# Patient Record
Sex: FEMALE | Race: WHITE | NOT HISPANIC OR LATINO | Employment: FULL TIME | URBAN - METROPOLITAN AREA
[De-identification: names, ages, dates, MRNs, and addresses within clinical notes are randomized per-mention and may not be internally consistent; named-entity substitution may affect disease eponyms.]

---

## 2019-11-22 ENCOUNTER — OFFICE VISIT (OUTPATIENT)
Dept: BARIATRICS/WEIGHT MGMT | Facility: CLINIC | Age: 64
End: 2019-11-22

## 2019-11-22 VITALS
SYSTOLIC BLOOD PRESSURE: 147 MMHG | TEMPERATURE: 97.5 F | DIASTOLIC BLOOD PRESSURE: 94 MMHG | BODY MASS INDEX: 41.95 KG/M2 | RESPIRATION RATE: 18 BRPM | HEART RATE: 67 BPM | HEIGHT: 70 IN | WEIGHT: 293 LBS

## 2019-11-22 DIAGNOSIS — R11.2 NAUSEA AND VOMITING, INTRACTABILITY OF VOMITING NOT SPECIFIED, UNSPECIFIED VOMITING TYPE: ICD-10-CM

## 2019-11-22 DIAGNOSIS — R13.19 OTHER DYSPHAGIA: Primary | ICD-10-CM

## 2019-11-22 DIAGNOSIS — E66.01 OBESITY, CLASS III, BMI 40-49.9 (MORBID OBESITY) (HCC): ICD-10-CM

## 2019-11-22 DIAGNOSIS — R10.13 DYSPEPSIA: ICD-10-CM

## 2019-11-22 DIAGNOSIS — Z98.84 HISTORY OF LAPAROSCOPIC ADJUSTABLE GASTRIC BANDING: ICD-10-CM

## 2019-11-22 PROCEDURE — 99203 OFFICE O/P NEW LOW 30 MIN: CPT | Performed by: SURGERY

## 2019-11-22 RX ORDER — CROMOLYN SODIUM 40 MG/ML
1 SOLUTION/ DROPS OPHTHALMIC AS NEEDED
COMMUNITY
Start: 2018-05-31

## 2019-11-22 RX ORDER — BUDESONIDE AND FORMOTEROL FUMARATE DIHYDRATE 80; 4.5 UG/1; UG/1
2 AEROSOL RESPIRATORY (INHALATION)
COMMUNITY
End: 2023-01-04 | Stop reason: ALTCHOICE

## 2019-11-22 RX ORDER — FUROSEMIDE 40 MG/1
20 TABLET ORAL EVERY OTHER DAY
COMMUNITY
Start: 2019-05-13 | End: 2022-03-14 | Stop reason: ALTCHOICE

## 2019-11-22 RX ORDER — PRAVASTATIN SODIUM 20 MG
20 TABLET ORAL NIGHTLY
COMMUNITY
Start: 2019-11-20

## 2019-11-22 RX ORDER — ALBUTEROL SULFATE 90 UG/1
2 AEROSOL, METERED RESPIRATORY (INHALATION) EVERY 4 HOURS PRN
COMMUNITY
Start: 2016-04-06

## 2019-11-22 RX ORDER — MELOXICAM 7.5 MG/1
7.5 TABLET ORAL DAILY
COMMUNITY
Start: 2019-10-30 | End: 2022-03-14 | Stop reason: ALTCHOICE

## 2019-11-22 RX ORDER — LEVOCETIRIZINE DIHYDROCHLORIDE 5 MG/1
5 TABLET, FILM COATED ORAL DAILY
COMMUNITY

## 2019-11-22 RX ORDER — ASPIRIN 81 MG/1
81 TABLET, CHEWABLE ORAL DAILY
COMMUNITY

## 2019-11-22 RX ORDER — SERTRALINE HYDROCHLORIDE 100 MG/1
150 TABLET, FILM COATED ORAL DAILY
COMMUNITY
Start: 2019-04-11

## 2019-11-22 RX ORDER — LOSARTAN POTASSIUM 100 MG/1
100 TABLET ORAL DAILY
COMMUNITY
Start: 2019-07-10

## 2019-11-22 NOTE — PROGRESS NOTES
[facility]ATR[facility] BARIATRIC SURGERY  [address] [facility] [address]  [facility] [address]  [phone]  [address] [id number] [phone]  Dept: [phone]  11/22/2019      Rosanne Adair.  [id number]  [id number]  1955  female      Chief Complaint   Patient presents with   • Consult     DISCUSS BAND REMOVAL        BH Post-Op Bariatric Surgery:   Rosanne Adair is status post Band procedure, performed on 2/24/09 Dr Resendiz at [facility].    HPI:   Today's weight is (!) 143 kg (316 lb) pounds, today's BMI is Body mass index is 45.34 kg/m²..  her greatest weight loss from surgery was 20 pounds. The patient reports an unwanted weight gain of 30 pounds.  [unfilled] denies fever, chills, chest pain, SOA, melena, hematochezia, hematemesis, dysuria, frequency, hematuria, jaundice or abdominal pain.    64-year-old female status post lap band placement 2009 who has not done well with her band.  She states that she has had continuous nausea and vomiting and regurgitation of food ever since the band was placed.  She feels like that this was not the right tool for her.  She would like to have the band removed.  She also complains of port site pain.      Diet and Exercise:   Diet history reviewed and discussed with the patient. Weight loss/gains to date discussed with the patient. She reports eating 3 meals per day, a typical portion size of 1 cup, eating 2 snacks per day, drinking 4 or more 8-oz. glasses of water per day, no carbonated beverage consumption and exercising regularly.     Supplements: Vitamin D    Review of Systems   Constitutional: Positive for fatigue.   Gastrointestinal: Positive for abdominal pain, nausea and vomiting.   All other systems reviewed and are negative.      Patient Active Problem List   Diagnosis   • Other dysphagia   • N&V (nausea and vomiting)   • Obesity, Class III, BMI 40-49.9 (morbid obesity) (CMS/[facility])   • Dyspepsia   •  History of laparoscopic adjustable gastric banding       No past medical history on file.    No past surgical history on file.    Allergies   Allergen Reactions   • Penicillins Anaphylaxis   • Latex Dermatitis         Current Outpatient Medications:   •  albuterol sulfate  (90 Base) MCG/ACT inhaler, Inhale 2 puffs., Disp: , Rfl:   •  aspirin 81 MG chewable tablet, Chew 81 mg Daily., Disp: , Rfl:   •  budesonide-formoterol (SYMBICORT) 80-4.5 MCG/ACT inhaler, Inhale 2 puffs., Disp: , Rfl:   •  Cholecalciferol (D 1000) 25 MCG (1000 UT) capsule, Take 2,000 Units by mouth Daily., Disp: , Rfl:   •  cromolyn (OPTICROM) 4 % ophthalmic solution, INSTILL ONE DROP TO THE AFFECTED EYE(S) FOUR TIMES A DAY, Disp: , Rfl:   •  furosemide (LASIX) 40 MG tablet, TAKE ONE TABLET BY MOUTH DAILY, Disp: , Rfl:   •  levocetirizine (XYZAL) 5 MG tablet, Take 5 mg by mouth Daily., Disp: , Rfl:   •  losartan (COZAAR) 100 MG tablet, TAKE ONE TABLET BY MOUTH DAILY, Disp: , Rfl:   •  meloxicam (MOBIC) 7.5 MG tablet, TAKE ONE TABLET BY MOUTH DAILY, Disp: , Rfl:   •  Olopatadine HCl (PAZEO) 0.7 % solution, As Needed., Disp: , Rfl:   •  pravastatin (PRAVACHOL) 20 MG tablet, TAKE ONE TABLET BY MOUTH ONCE NIGHTLY, Disp: , Rfl:   •  sertraline (ZOLOFT) 100 MG tablet, TAKE ONE AND ONE-HALF (1 & 1/2) TABLET BY MOUTH DAILY, Disp: , Rfl:     Social History     Socioeconomic History   • Marital status:      Spouse name: Not on file   • Number of children: Not on file   • Years of education: Not on file   • Highest education level: Not on file       No family history on file.    The following portions of the patient's history were reviewed and updated as appropriate: allergies, current medications, past family history, past medical history, past social history, past surgical history and problem list.    Vitals:    11/22/19 1005   BP: 147/94   Pulse: 67   Resp: 18   Temp: 97.5 °F (36.4 °C)       Physical Exam   Constitutional: She is oriented to  person, place, and time. She appears well-nourished.   HENT:   Head: Normocephalic and atraumatic.   Mouth/Throat: Oropharynx is clear and moist.   Eyes: Conjunctivae and EOM are normal. Pupils are equal, round, and reactive to light. No scleral icterus.   Neck: Normal range of motion. Neck supple. No thyromegaly present.   Cardiovascular: Normal rate and regular rhythm.   Pulmonary/Chest: Effort normal and breath sounds normal.   Abdominal: Soft. Bowel sounds are normal. She exhibits no distension. There is tenderness. There is no rebound and no guarding. No hernia.   Tenderness in the left upper quadrant at the port site   Musculoskeletal: Normal range of motion.   Lymphadenopathy:     She has no cervical adenopathy.   Neurological: She is alert and oriented to person, place, and time. No cranial nerve deficit. Coordination normal.   Skin: Skin is warm and dry. No erythema.   Psychiatric: She has a normal mood and affect. Her behavior is normal.   Vitals reviewed.          Assessment:   Rosanne Adair has severe obesity with multiple co-morbidities who would like to transfer her bariatric care to us and participate in our bariatric program.      Encounter Diagnoses   Name Primary?   • Other dysphagia Yes   • Nausea and vomiting, intractability of vomiting not specified, unspecified vomiting type    • Dyspepsia    • Obesity, Class III, BMI 40-49.9 (morbid obesity) (CMS/HCC)    • History of laparoscopic adjustable gastric banding          Discussion/Summary/Plan:     64-year-old female status post lap band placement 2009 by Dr. Resendiz who would like to transfer her care to us.  Patient continues to have regurgitation of food, nausea and vomiting dysphasia with solids and port site pain.  Patient would like to proceed with band removal.  We will go ahead and order an upper GI to evaluate the lap band.  If unremarkable then we will proceed with upper endoscopy.    Recommended patient be sure to eat at least three meals  per day all with high lean protein, vegetables and fruit. Be sure to limit/cut back on daily simple carbohydrate intake. Discussed with the patient the recommended amount of water per day to intake. Reviewed vitamin requirements. Be sure to do routine exercise including both cardio and strength training. Recommended patient to see our dietician to go into more detail regarding diet changes.    Instructions / Recommendations: dietary counseling recommended, recommended a daily protein intake of  grams, vitamin supplements recommended, recommended exercising at least 150 minutes per week, behavior modifications recommended and instructed to call the office for concerns, questions, or problems.    The patient was instructed to follow up in 2 to 3 weeks.     The patient was counseled regarding diet, exercise and the surgical procedures available. Our bariatric manual was given to the patient and was discussed in detail. Dietician appointment was highly recommended as well as attending support groups.  Total time of encounter was over 35 minutes and 30 minutes was spent counseling.

## 2019-12-06 ENCOUNTER — HOSPITAL ENCOUNTER (OUTPATIENT)
Dept: GENERAL RADIOLOGY | Facility: HOSPITAL | Age: 64
Discharge: HOME | End: 2019-12-06
Admitting: SURGERY

## 2019-12-06 PROCEDURE — 74241: CPT

## 2019-12-06 RX ADMIN — BARIUM SULFATE 70 ML: 960 POWDER, FOR SUSPENSION ORAL at 10:15

## 2019-12-13 ENCOUNTER — PREP FOR SURGERY (OUTPATIENT)
Dept: OTHER | Facility: HOSPITAL | Age: 64
End: 2019-12-13

## 2019-12-13 DIAGNOSIS — R11.2 NAUSEA AND VOMITING, INTRACTABILITY OF VOMITING NOT SPECIFIED, UNSPECIFIED VOMITING TYPE: Primary | ICD-10-CM

## 2019-12-13 RX ORDER — SODIUM CHLORIDE, SODIUM LACTATE, POTASSIUM CHLORIDE, CALCIUM CHLORIDE 600; 310; 30; 20 MG/100ML; MG/100ML; MG/100ML; MG/100ML
30 INJECTION, SOLUTION INTRAVENOUS CONTINUOUS
Status: CANCELLED | OUTPATIENT
Start: 2019-12-24

## 2019-12-18 PROBLEM — R11.2 NAUSEA AND VOMITING: Status: ACTIVE | Noted: 2019-12-18

## 2019-12-24 ENCOUNTER — ANESTHESIA (OUTPATIENT)
Dept: GASTROENTEROLOGY | Facility: HOSPITAL | Age: 64
End: 2019-12-24

## 2019-12-24 ENCOUNTER — HOSPITAL ENCOUNTER (OUTPATIENT)
Facility: HOSPITAL | Age: 64
Setting detail: HOSPITAL OUTPATIENT SURGERY
Discharge: HOME | End: 2019-12-24
Attending: SURGERY | Admitting: SURGERY

## 2019-12-24 ENCOUNTER — ANESTHESIA EVENT (OUTPATIENT)
Dept: GASTROENTEROLOGY | Facility: HOSPITAL | Age: 64
End: 2019-12-24

## 2019-12-24 VITALS
HEART RATE: 68 BPM | BODY MASS INDEX: 41.95 KG/M2 | TEMPERATURE: 98 F | DIASTOLIC BLOOD PRESSURE: 74 MMHG | HEIGHT: 70 IN | OXYGEN SATURATION: 99 % | WEIGHT: 293 LBS | SYSTOLIC BLOOD PRESSURE: 140 MMHG | RESPIRATION RATE: 16 BRPM

## 2019-12-24 DIAGNOSIS — R11.2 NAUSEA AND VOMITING, INTRACTABILITY OF VOMITING NOT SPECIFIED, UNSPECIFIED VOMITING TYPE: ICD-10-CM

## 2019-12-24 PROCEDURE — 87081 CULTURE SCREEN ONLY: CPT | Performed by: SURGERY

## 2019-12-24 PROCEDURE — 43239 EGD BIOPSY SINGLE/MULTIPLE: CPT | Performed by: SURGERY

## 2019-12-24 PROCEDURE — 25010000002 PROPOFOL 10 MG/ML EMULSION: Performed by: NURSE ANESTHETIST, CERTIFIED REGISTERED

## 2019-12-24 RX ORDER — SODIUM CHLORIDE 0.9 % (FLUSH) 0.9 %
3 SYRINGE (ML) INJECTION EVERY 12 HOURS SCHEDULED
Status: DISCONTINUED | OUTPATIENT
Start: 2019-12-24 | End: 2019-12-24 | Stop reason: HOSPADM

## 2019-12-24 RX ORDER — SODIUM CHLORIDE 0.9 % (FLUSH) 0.9 %
10 SYRINGE (ML) INJECTION AS NEEDED
Status: DISCONTINUED | OUTPATIENT
Start: 2019-12-24 | End: 2019-12-24 | Stop reason: HOSPADM

## 2019-12-24 RX ORDER — ONDANSETRON 2 MG/ML
INJECTION INTRAMUSCULAR; INTRAVENOUS AS NEEDED
Status: DISCONTINUED | OUTPATIENT
Start: 2019-12-24 | End: 2019-12-24

## 2019-12-24 RX ORDER — SODIUM CHLORIDE, SODIUM LACTATE, POTASSIUM CHLORIDE, CALCIUM CHLORIDE 600; 310; 30; 20 MG/100ML; MG/100ML; MG/100ML; MG/100ML
30 INJECTION, SOLUTION INTRAVENOUS CONTINUOUS
Status: DISCONTINUED | OUTPATIENT
Start: 2019-12-24 | End: 2019-12-24 | Stop reason: HOSPADM

## 2019-12-24 RX ORDER — LIDOCAINE HYDROCHLORIDE 20 MG/ML
INJECTION, SOLUTION INFILTRATION; PERINEURAL AS NEEDED
Status: DISCONTINUED | OUTPATIENT
Start: 2019-12-24 | End: 2019-12-24 | Stop reason: SURG

## 2019-12-24 RX ORDER — PROPOFOL 10 MG/ML
VIAL (ML) INTRAVENOUS AS NEEDED
Status: DISCONTINUED | OUTPATIENT
Start: 2019-12-24 | End: 2019-12-24 | Stop reason: SURG

## 2019-12-24 RX ORDER — PROPOFOL 10 MG/ML
VIAL (ML) INTRAVENOUS CONTINUOUS PRN
Status: DISCONTINUED | OUTPATIENT
Start: 2019-12-24 | End: 2019-12-24 | Stop reason: SURG

## 2019-12-24 RX ORDER — SODIUM CHLORIDE, SODIUM LACTATE, POTASSIUM CHLORIDE, CALCIUM CHLORIDE 600; 310; 30; 20 MG/100ML; MG/100ML; MG/100ML; MG/100ML
30 INJECTION, SOLUTION INTRAVENOUS CONTINUOUS PRN
Status: DISCONTINUED | OUTPATIENT
Start: 2019-12-24 | End: 2019-12-24 | Stop reason: HOSPADM

## 2019-12-24 RX ADMIN — PROPOFOL 50 MG: 10 INJECTION, EMULSION INTRAVENOUS at 07:08

## 2019-12-24 RX ADMIN — PROPOFOL 300 MCG/KG/MIN: 10 INJECTION, EMULSION INTRAVENOUS at 07:06

## 2019-12-24 RX ADMIN — LIDOCAINE HYDROCHLORIDE 40 MG: 20 INJECTION, SOLUTION INFILTRATION; PERINEURAL at 07:06

## 2019-12-24 RX ADMIN — SODIUM CHLORIDE, POTASSIUM CHLORIDE, SODIUM LACTATE AND CALCIUM CHLORIDE 30 ML/HR: 600; 310; 30; 20 INJECTION, SOLUTION INTRAVENOUS at 06:23

## 2019-12-24 RX ADMIN — PROPOFOL 50 MG: 10 INJECTION, EMULSION INTRAVENOUS at 07:06

## 2019-12-24 NOTE — ANESTHESIA POSTPROCEDURE EVALUATION
Patient: Rosanne Adair    Procedure Summary     Date:  12/24/19 Room / Location:   [facility] /  [facility]    Anesthesia Start:  0658 Anesthesia Stop:  0712    Procedure:  ESOPHAGOGASTRODUODENOSCOPY WITH BIOPSY (N/A Esophagus) Diagnosis:       Nausea and vomiting, intractability of vomiting not specified, unspecified vomiting type      (Nausea and vomiting, intractability of vomiting not specified, unspecified vomiting type [R11.2])    Surgeon:  Wilbert Szymanski Jr., MD Provider:  Wilbert Lipscomb MD    Anesthesia Type:  MAC ASA Status:  3          Anesthesia Type: MAC    Vitals  Vitals Value Taken Time   /92 12/24/2019  7:17 AM   Temp     Pulse 61 12/24/2019  7:17 AM   Resp 16 12/24/2019  7:17 AM   SpO2 98 % 12/24/2019  7:17 AM           Post Anesthesia Care and Evaluation    Patient location during evaluation: bedside  Patient participation: complete - patient participated  Level of consciousness: awake and alert  Pain score: 0  Pain management: adequate  Airway patency: patent  Anesthetic complications: No anesthetic complications  PONV Status: none  Cardiovascular status: acceptable  Respiratory status: acceptable  Hydration status: acceptable  Post Neuraxial Block status: Motor and sensory function returned to baseline

## 2019-12-24 NOTE — H&P
[facility] BARIATRIC SURGERY  [address] [facility] [address]  [facility] [address]  [phone]  [address] [id number] [phone]  Dept: [phone]  11/22/2019        Rosanne Adair.  [id number]  [id number]  1955  female             Chief Complaint   Patient presents with   • Consult       DISCUSS BAND REMOVAL          BH Post-Op Bariatric Surgery:   Rosanne Adair is status post Band procedure, performed on 2/24/09 Dr Resendiz at [facility].     HPI:   Today's weight is (!) 143 kg (316 lb) pounds, today's BMI is Body mass index is 45.34 kg/m²..  her greatest weight loss from surgery was 20 pounds. The patient reports an unwanted weight gain of 30 pounds.  [unfilled] denies fever, chills, chest pain, SOA, melena, hematochezia, hematemesis, dysuria, frequency, hematuria, jaundice or abdominal pain.     64-year-old female status post lap band placement 2009 who has not done well with her band.  She states that she has had continuous nausea and vomiting and regurgitation of food ever since the band was placed.  She feels like that this was not the right tool for her.  She would like to have the band removed.  She also complains of port site pain.        Diet and Exercise:   Diet history reviewed and discussed with the patient. Weight loss/gains to date discussed with the patient. She reports eating 3 meals per day, a typical portion size of 1 cup, eating 2 snacks per day, drinking 4 or more 8-oz. glasses of water per day, no carbonated beverage consumption and exercising regularly.      Supplements: Vitamin D     Review of Systems   Constitutional: Positive for fatigue.   Gastrointestinal: Positive for abdominal pain, nausea and vomiting.   All other systems reviewed and are negative.            Patient Active Problem List   Diagnosis   • Other dysphagia   • N&V (nausea and vomiting)   • Obesity, Class III, BMI 40-49.9 (morbid obesity)  (CMS/[facility])   • Dyspepsia   • History of laparoscopic adjustable gastric banding         Medical History   No past medical history on file.        Surgical History   No past surgical history on file.             Allergies   Allergen Reactions   • Penicillins Anaphylaxis   • Latex Dermatitis            Current Outpatient Medications:   •  albuterol sulfate  (90 Base) MCG/ACT inhaler, Inhale 2 puffs., Disp: , Rfl:   •  aspirin 81 MG chewable tablet, Chew 81 mg Daily., Disp: , Rfl:   •  budesonide-formoterol (SYMBICORT) 80-4.5 MCG/ACT inhaler, Inhale 2 puffs., Disp: , Rfl:   •  Cholecalciferol (D 1000) 25 MCG (1000 UT) capsule, Take 2,000 Units by mouth Daily., Disp: , Rfl:   •  cromolyn (OPTICROM) 4 % ophthalmic solution, INSTILL ONE DROP TO THE AFFECTED EYE(S) FOUR TIMES A DAY, Disp: , Rfl:   •  furosemide (LASIX) 40 MG tablet, TAKE ONE TABLET BY MOUTH DAILY, Disp: , Rfl:   •  levocetirizine (XYZAL) 5 MG tablet, Take 5 mg by mouth Daily., Disp: , Rfl:   •  losartan (COZAAR) 100 MG tablet, TAKE ONE TABLET BY MOUTH DAILY, Disp: , Rfl:   •  meloxicam (MOBIC) 7.5 MG tablet, TAKE ONE TABLET BY MOUTH DAILY, Disp: , Rfl:   •  Olopatadine HCl (PAZEO) 0.7 % solution, As Needed., Disp: , Rfl:   •  pravastatin (PRAVACHOL) 20 MG tablet, TAKE ONE TABLET BY MOUTH ONCE NIGHTLY, Disp: , Rfl:   •  sertraline (ZOLOFT) 100 MG tablet, TAKE ONE AND ONE-HALF (1 & 1/2) TABLET BY MOUTH DAILY, Disp: , Rfl:      Social History   Social History            Socioeconomic History   • Marital status:        Spouse name: Not on file   • Number of children: Not on file   • Years of education: Not on file   • Highest education level: Not on file            No family history on file.     The following portions of the patient's history were reviewed and updated as appropriate: allergies, current medications, past family history, past medical history, past social history, past surgical history and problem list.         Vitals:     11/22/19  1005   BP: 147/94   Pulse: 67   Resp: 18   Temp: 97.5 °F (36.4 °C)         Physical Exam   Constitutional: She is oriented to person, place, and time. She appears well-nourished.   HENT:   Head: Normocephalic and atraumatic.   Mouth/Throat: Oropharynx is clear and moist.   Eyes: Conjunctivae and EOM are normal. Pupils are equal, round, and reactive to light. No scleral icterus.   Neck: Normal range of motion. Neck supple. No thyromegaly present.   Cardiovascular: Normal rate and regular rhythm.   Pulmonary/Chest: Effort normal and breath sounds normal.   Abdominal: Soft. Bowel sounds are normal. She exhibits no distension. There is tenderness. There is no rebound and no guarding. No hernia.   Tenderness in the left upper quadrant at the port site   Musculoskeletal: Normal range of motion.   Lymphadenopathy:     She has no cervical adenopathy.   Neurological: She is alert and oriented to person, place, and time. No cranial nerve deficit. Coordination normal.   Skin: Skin is warm and dry. No erythema.   Psychiatric: She has a normal mood and affect. Her behavior is normal.   Vitals reviewed.              Assessment:   Rosanne Adair has severe obesity with multiple co-morbidities who would like to transfer her bariatric care to us and participate in our bariatric program.               Encounter Diagnoses   Name Primary?   • Other dysphagia Yes   • Nausea and vomiting, intractability of vomiting not specified, unspecified vomiting type     • Dyspepsia     • Obesity, Class III, BMI 40-49.9 (morbid obesity) (CMS/[facility])     • History of laparoscopic adjustable gastric banding              Discussion/Summary/Plan:      64-year-old female status post lap band placement 2009 by Dr. Resendiz who would like to transfer her care to us.  Patient continues to have regurgitation of food, nausea and vomiting dysphasia with solids and port site pain.  Patient would like to proceed with band removal.  We will go ahead and order an upper  GI to evaluate the lap band.  If unremarkable then we will proceed with upper endoscopy.     Recommended patient be sure to eat at least three meals per day all with high lean protein, vegetables and fruit. Be sure to limit/cut back on daily simple carbohydrate intake. Discussed with the patient the recommended amount of water per day to intake. Reviewed vitamin requirements. Be sure to do routine exercise including both cardio and strength training. Recommended patient to see our dietician to go into more detail regarding diet changes.     Instructions / Recommendations: dietary counseling recommended, recommended a daily protein intake of  grams, vitamin supplements recommended, recommended exercising at least 150 minutes per week, behavior modifications recommended and instructed to call the office for concerns, questions, or problems.     The patient was instructed to follow up in 2 to 3 weeks.      The patient was counseled regarding diet, exercise and the surgical procedures available. Our bariatric manual was given to the patient and was discussed in detail. Dietician appointment was highly recommended as well as attending support groups.  Total time of encounter was over 35 minutes and 30 minutes was spent counseling.

## 2019-12-24 NOTE — DISCHARGE INSTRUCTIONS
For the next 24 hours patient needs to be with a responsible adult.    For 24 hours DO NOT drive, operate machinery, appliances, drink alcohol, make important decisions or sign legal documents.    Start with a light or bland diet if you are feeling sick to your stomach otherwise advance to regular diet as tolerated.    Follow recommendations on procedure report if provided by your doctor.    Call Dr. Szymanski for problems [phone]    Problems may include but not limited to: large amounts of bleeding, trouble breathing, repeated vomiting, severe unrelieved pain, fever or chills.

## 2019-12-24 NOTE — ANESTHESIA PREPROCEDURE EVALUATION
Anesthesia Evaluation     Patient summary reviewed   NPO Solid Status: > 8 hours  NPO Liquid Status: > 8 hours           Airway   Mallampati: II  TM distance: >3 FB  Neck ROM: full  No difficulty expected  Dental - normal exam     Pulmonary     breath sounds clear to auscultation  Cardiovascular   Exercise tolerance: good (4-7 METS)    Rhythm: regular  Rate: normal        Neuro/Psych  GI/Hepatic/Renal/Endo      Musculoskeletal     Abdominal    Substance History      OB/GYN          Other                        Anesthesia Plan    ASA 3     MAC     intravenous induction     Anesthetic plan, all risks, benefits, and alternatives have been provided, discussed and informed consent has been obtained with: patient.

## 2019-12-24 NOTE — OP NOTE
Surgeon: Wilbert Szymanski Jr., M.D.    Preoperative Diagnosis: Dysphagia with solids with history of gastric band    Postoperative Diagnosis: #1 gastritis #2 pouch dilatation    Procedure Performed: Transoral esophagogastroduodenoscopy with antral biopsies    Indications: 64-year-old female status post gastric band with complaints of dysphagia with solids    Procedure:     The procedure, indications, preparation and potential complications were explained to the patient, who indicated understanding and signed the corresponding consent forms.  The patient was identified, taken to the endoscopy suite, and placed on the left side down decubitus position.  The patient underwent a MAC anesthesia and was appropriately monitored through the case by the anesthesia personnel with continuous pulse oximetry, blood pressure, and cardiac monitoring.  A bite block was placed.  After adequate IV sedation and using a transoral technique a lubed flexible endoscope was placed in the hypopharynx and advanced to the second portion of the duodenum without difficulty. The scope was then withdrawn back into the antrum of the stomach.  Cold forcep biopsies of the antrum were taken to rule out Helicobacter pylori.  The scope was retroflexed noting the body, fundus and cardia.  The scope was then withdrawn back into the esophagus after decompressing the stomach.  The Z line was noted and GE junction measured from the incisors.  The scope was then completely withdrawn.  The patient tolerated the procedure well and left the endoscopy suite in stable condition.  The findings are listed below.    Duodenum: Unremarkable  Antrum: Moderate patchy erythema  Body/Fundus: Patchy erythema,: Indentation of lap band noted without evidence of erosion  Cardia: Minimal pouch dilatation  Esophagus: Z line regular, no erosive esophagitis; GE junction measured approximately 40 cm from the incisors    Recommendations:     Await biopsy results and follow-up in the  office.  We will plan for band removal

## 2019-12-25 LAB — UREASE TISS QL: NEGATIVE

## 2019-12-26 ENCOUNTER — TELEPHONE (OUTPATIENT)
Dept: BARIATRICS/WEIGHT MGMT | Facility: CLINIC | Age: 64
End: 2019-12-26

## 2019-12-26 NOTE — TELEPHONE ENCOUNTER
Spoke to patient and informed her of negative results.     ----- Message from Wilbert Szymanski Jr., MD sent at 12/26/2019  6:44 AM EST -----  Please call patient with negative results.

## 2020-01-02 ENCOUNTER — OFFICE VISIT (OUTPATIENT)
Dept: BARIATRICS/WEIGHT MGMT | Facility: CLINIC | Age: 65
End: 2020-01-02

## 2020-01-02 VITALS
HEIGHT: 70 IN | TEMPERATURE: 97.3 F | HEART RATE: 69 BPM | DIASTOLIC BLOOD PRESSURE: 75 MMHG | SYSTOLIC BLOOD PRESSURE: 149 MMHG | BODY MASS INDEX: 41.95 KG/M2 | WEIGHT: 293 LBS | RESPIRATION RATE: 18 BRPM

## 2020-01-02 DIAGNOSIS — K95.09 GASTRIC BAND MALFUNCTION: ICD-10-CM

## 2020-01-02 DIAGNOSIS — R10.13 DYSPEPSIA: ICD-10-CM

## 2020-01-02 DIAGNOSIS — E66.01 OBESITY, CLASS III, BMI 40-49.9 (MORBID OBESITY) (HCC): Primary | ICD-10-CM

## 2020-01-02 DIAGNOSIS — R11.2 NAUSEA AND VOMITING, INTRACTABILITY OF VOMITING NOT SPECIFIED, UNSPECIFIED VOMITING TYPE: ICD-10-CM

## 2020-01-02 DIAGNOSIS — Z98.84 HISTORY OF LAPAROSCOPIC ADJUSTABLE GASTRIC BANDING: ICD-10-CM

## 2020-01-02 DIAGNOSIS — R13.19 OTHER DYSPHAGIA: ICD-10-CM

## 2020-01-02 PROCEDURE — 99214 OFFICE O/P EST MOD 30 MIN: CPT | Performed by: SURGERY

## 2020-01-02 NOTE — H&P
[facility] BARIATRIC SURGERY  [address] [address]  [phone]  [address] [address]  [phone]  Dept: [phone]  1/2/2020      Rosanne Adair.  [id number]  [id number]  1955  female      Chief Complaint   Patient presents with   • Follow-up     EGD FOLLOW UP       BH Post-Op Bariatric Surgery:   Rosanne Adair is status post Lapband procedure, performed on 2/24/09.     HPI:   Today's weight is 136 kg (299 lb)  pounds, today's BMI is Body mass index is 42.9 kg/m².. The patient reports difficulty with solid foods.    Patient admits to nausea and dysphagia. The patient denies abdominal pain. The patientdoes have vomitng. The patientdoes have reflux.    Diet and Exercise: Diet history reviewed and discussed with the patient. Weight loss/gains to date discussed with the patient. She reports eating 3 meals per day, a typical portion size of 1 cup, eating 2 snack per day, drinking 4 8-oz. glasses of water per day. The patient cannot tolerate solid protein.   The patient is not eating protein first. The patient is limiting food volume. The patient is taking vitamins. The patient is limiting snacking.    The following portions of the patient's history were reviewed and updated as appropriate: allergies, current medications, past family history, past medical history, past social history, past surgical history and problem list.    Vitals:    01/02/20 1450   BP: 149/75   Pulse: 69   Resp: 18   Temp: 97.3 °F (36.3 °C)       Review of Systems   Constitutional: Positive for fatigue.   Gastrointestinal: Positive for abdominal pain, nausea and vomiting.   Musculoskeletal: Positive for arthralgias and back pain.   All other systems reviewed and are negative.      Physical Exam   Constitutional: She is oriented to person, place, and time. She appears well-nourished.   HENT:   Head: Normocephalic and atraumatic.    Mouth/Throat: Oropharynx is clear and moist.   Eyes: Pupils are equal, round, and reactive to light. Conjunctivae and EOM are normal. No scleral icterus.   Neck: Normal range of motion. Neck supple. No thyromegaly present.   Cardiovascular: Normal rate and regular rhythm.   Pulmonary/Chest: Effort normal and breath sounds normal.   Abdominal: Soft. Bowel sounds are normal. She exhibits no distension. There is no rebound. No hernia.   Mild tenderness port site left upper quadrant   Musculoskeletal: Normal range of motion.   Lymphadenopathy:     She has no cervical adenopathy.   Neurological: She is alert and oriented to person, place, and time. No cranial nerve deficit. Coordination normal.   Skin: Skin is warm and dry. No erythema.   Psychiatric: She has a normal mood and affect. Her behavior is normal.   Vitals reviewed.      Assessment: Post-operatively the patient status post lap band February 2009 by Dr. Resendiz at [facility] with complaints of dysphagia and heartburn as well as port site pain.  Upper endoscopy did reveal pouch dilatation.  Patient would like to proceed with band removal.  The risks and benefits of the procedure were discussed with the patient in detail and all questions were answered.  Possibility of open, bleeding, infection, bowel injury, deep venous thrombosis, pulmonary embolism, incisional hernias, renal failure, myocardial infarction, respiratory and cardiac arrest and death were discussed. Consent will be signed and witnessed.    Encounter Diagnoses   Name Primary?   • Obesity, Class III, BMI 40-49.9 (morbid obesity) (CMS/[facility]) Yes   • Nausea and vomiting, intractability of vomiting not specified, unspecified vomiting type    • Other dysphagia    • Dyspepsia    • History of laparoscopic adjustable gastric banding    • Gastric band malfunction        Plan:    My impression is Rosanne Adair has a problem of her band as listed above.  I think she would benefit from band removal.     Reviewed  the importance of being able to tolerate dense/solid protein and vegetables for weight loss. Discussed eating foods that are easier to tolerate, softer foods, usually contribute to weight gain because they are higher calorie/carb and will not keep patient full for the recommended 3-4 hours.      She should follow-up after band removal.      UGI discussed with patient in detail and all questions answered  EGD nkechi with patient in detail and all questions answered.  Patient was noted to have gastritis and H. pylori negative.  Patient will take an H2 blocker PPI over-the-counter.  Also instructed about taking nonsteroidals like her mobic. She will discuss this with her primary care physician.    Approximately 25 minutes was spent with the patient and over 20 minutes spent counseling.    Activity restrictions: None.   Instructions / Recommendations: dietary counseling recommended, recommended a daily protein intake of  grams, patient was advised that the lap band system works best when consuming solid foods, vitamin supplement(s) recommended, recommended exercising at least 150 minutes per week, behavior modifications recommended and instructed to call the office for concerns, questions, or problems.

## 2020-01-13 ENCOUNTER — PREP FOR SURGERY (OUTPATIENT)
Dept: OTHER | Facility: HOSPITAL | Age: 65
End: 2020-01-13

## 2020-01-13 DIAGNOSIS — R13.19 OTHER DYSPHAGIA: Primary | ICD-10-CM

## 2020-01-13 RX ORDER — SODIUM CHLORIDE, SODIUM LACTATE, POTASSIUM CHLORIDE, CALCIUM CHLORIDE 600; 310; 30; 20 MG/100ML; MG/100ML; MG/100ML; MG/100ML
100 INJECTION, SOLUTION INTRAVENOUS CONTINUOUS
Status: CANCELLED | OUTPATIENT
Start: 2020-02-12

## 2020-01-13 RX ORDER — SCOLOPAMINE TRANSDERMAL SYSTEM 1 MG/1
1 PATCH, EXTENDED RELEASE TRANSDERMAL CONTINUOUS
Status: CANCELLED | OUTPATIENT
Start: 2020-02-12 | End: 2020-02-15

## 2020-01-13 RX ORDER — CHLORHEXIDINE GLUCONATE 0.12 MG/ML
15 RINSE ORAL
Status: CANCELLED | OUTPATIENT
Start: 2020-02-12 | End: 2020-02-12

## 2020-01-13 RX ORDER — ACETAMINOPHEN 160 MG/5ML
975 SOLUTION ORAL ONCE
Status: CANCELLED | OUTPATIENT
Start: 2020-02-12 | End: 2020-01-13

## 2020-01-13 RX ORDER — PANTOPRAZOLE SODIUM 40 MG/10ML
40 INJECTION, POWDER, LYOPHILIZED, FOR SOLUTION INTRAVENOUS ONCE
Status: CANCELLED | OUTPATIENT
Start: 2020-02-12 | End: 2020-01-13

## 2020-01-13 RX ORDER — SODIUM CHLORIDE 0.9 % (FLUSH) 0.9 %
3 SYRINGE (ML) INJECTION EVERY 12 HOURS SCHEDULED
Status: CANCELLED | OUTPATIENT
Start: 2020-02-12

## 2020-01-13 RX ORDER — SODIUM CHLORIDE 0.9 % (FLUSH) 0.9 %
1-10 SYRINGE (ML) INJECTION AS NEEDED
Status: CANCELLED | OUTPATIENT
Start: 2020-02-12

## 2020-01-13 RX ORDER — METOCLOPRAMIDE HYDROCHLORIDE 5 MG/ML
10 INJECTION INTRAMUSCULAR; INTRAVENOUS ONCE
Status: CANCELLED | OUTPATIENT
Start: 2020-02-12 | End: 2020-01-13

## 2020-01-28 ENCOUNTER — APPOINTMENT (OUTPATIENT)
Dept: PREADMISSION TESTING | Facility: HOSPITAL | Age: 65
End: 2020-01-28

## 2020-01-28 VITALS
SYSTOLIC BLOOD PRESSURE: 146 MMHG | WEIGHT: 290 LBS | TEMPERATURE: 97.6 F | DIASTOLIC BLOOD PRESSURE: 83 MMHG | OXYGEN SATURATION: 98 % | BODY MASS INDEX: 41.52 KG/M2 | RESPIRATION RATE: 16 BRPM | HEIGHT: 70 IN | HEART RATE: 52 BPM

## 2020-01-28 DIAGNOSIS — R13.19 OTHER DYSPHAGIA: ICD-10-CM

## 2020-01-28 LAB
ALBUMIN SERPL-MCNC: 4.5 G/DL (ref 3.5–5.2)
ALBUMIN/GLOB SERPL: 2 G/DL
ALP SERPL-CCNC: 92 U/L (ref 39–117)
ALT SERPL W P-5'-P-CCNC: 19 U/L (ref 1–33)
ANION GAP SERPL CALCULATED.3IONS-SCNC: 16.4 MMOL/L (ref 5–15)
AST SERPL-CCNC: 19 U/L (ref 1–32)
BILIRUB SERPL-MCNC: 0.4 MG/DL (ref 0.2–1.2)
BUN BLD-MCNC: 14 MG/DL (ref 8–23)
BUN/CREAT SERPL: 12.1 (ref 7–25)
CALCIUM SPEC-SCNC: 9.4 MG/DL (ref 8.6–10.5)
CHLORIDE SERPL-SCNC: 98 MMOL/L (ref 98–107)
CO2 SERPL-SCNC: 24.6 MMOL/L (ref 22–29)
CREAT BLD-MCNC: 1.16 MG/DL (ref 0.57–1)
DEPRECATED RDW RBC AUTO: 41.4 FL (ref 37–54)
ERYTHROCYTE [DISTWIDTH] IN BLOOD BY AUTOMATED COUNT: 13.1 % (ref 12.3–15.4)
GFR SERPL CREATININE-BSD FRML MDRD: 47 ML/MIN/1.73
GLOBULIN UR ELPH-MCNC: 2.2 GM/DL
GLUCOSE BLD-MCNC: 70 MG/DL (ref 65–99)
HCT VFR BLD AUTO: 38 % (ref 34–46.6)
HGB BLD-MCNC: 13 G/DL (ref 12–15.9)
MCH RBC QN AUTO: 29.7 PG (ref 26.6–33)
MCHC RBC AUTO-ENTMCNC: 34.2 G/DL (ref 31.5–35.7)
MCV RBC AUTO: 86.8 FL (ref 79–97)
PLATELET # BLD AUTO: 154 10*3/MM3 (ref 140–450)
PMV BLD AUTO: 9.8 FL (ref 6–12)
POTASSIUM BLD-SCNC: 4.1 MMOL/L (ref 3.5–5.2)
PROT SERPL-MCNC: 6.7 G/DL (ref 6–8.5)
RBC # BLD AUTO: 4.38 10*6/MM3 (ref 3.77–5.28)
SODIUM BLD-SCNC: 139 MMOL/L (ref 136–145)
WBC NRBC COR # BLD: 3.5 10*3/MM3 (ref 3.4–10.8)

## 2020-01-28 PROCEDURE — 93005 ELECTROCARDIOGRAM TRACING: CPT

## 2020-01-28 PROCEDURE — 85027 COMPLETE CBC AUTOMATED: CPT | Performed by: SURGERY

## 2020-01-28 PROCEDURE — 36415 COLL VENOUS BLD VENIPUNCTURE: CPT

## 2020-01-28 PROCEDURE — 80053 COMPREHEN METABOLIC PANEL: CPT | Performed by: SURGERY

## 2020-01-28 PROCEDURE — 93010 ELECTROCARDIOGRAM REPORT: CPT | Performed by: INTERNAL MEDICINE

## 2020-01-28 NOTE — PAT
2008 MRSA RIGHT INDEX FINGER WOUND. TREATED [facility].  INFECTION CONTROL NOTIFIED EXT 8052 SPOKE WITH BATSHEVA. REPEATED AND VERIFIED HISTORY.

## 2020-01-28 NOTE — DISCHARGE INSTRUCTIONS
Take only the following medications the morning of surgery with a small sip of water:       LOSARTAN  SYMBICORT INHALER      Do not take Bariatric Vitamins, Folic Acid, Actigall (if applicable) or Lovenox Injections (if applicable) the morning of surgery.  If you have a history of blood clots or have a BMI greater than 50, Dr. Szymanski may order Lovenox for after surgery. Do not take Lovenox blood thinner before surgery.      General Instructions:   • Do not eat solid food after midnight the night before surgery.    • After midnight, you may have up to 20 oz of clear-artificially sweetened liquid (to include Gatorade Zero, Powerade Zero, Water, Tea/Coffee with no cream, milk or sugar).  Nothing red in color.  Any drinks must be completed 2 hours before your arrival time 0345 AM STOP DRINKING!!. Patients who avoid smoking, chewing tobacco and alcohol for 4 weeks prior to surgery have a reduced risk of post-operative complications.  Quit smoking as many days before surgery as you can.  • Do not smoke, use chewing tobacco or drink alcohol the day of surgery.   • Bring any papers given to you in the doctor’s office.  Wear clean comfortable clothes.  • Do not wear contact lenses, false eyelashes or make-up.  Bring a case for your glasses.   • Bring crutches or walker if applicable.  • Remove all piercings.  Leave jewelry and any other valuables at home.  • Remove fingernail polish, gel overlays or any artificial nails.  • Hair extensions with metal clips must be removed prior to surgery.  • The Pre-Admission Testing nurse will instruct you to bring medications if unable to obtain an accurate list in Pre-Admission Testing.    Preventing a Surgical Site Infection:  • For 2 to 3 days before surgery, avoid shaving with a razor because the razor can irritate skin and make it easier to develop an infection.    • Any areas of open skin can increase the risk of a post-operative wound infection by allowing bacteria to enter and  travel throughout the body.  Notify your surgeon if you have any skin wounds / rashes even if it is not near the expected surgical site.  The area will need assessed to determine if surgery should be delayed until it is healed.  • 2 days prior to surgery, take a shower using a fresh bar of anti-bacterial soap (such as Dial).  Use a clean washcloth and dry with a clean towel.    • The day prior to surgery, take a shower using a fresh bar of anti-bacterial soap (such as Dial).  Use a clean washcloth and dry with a clean towel.  After the shower, utilize a package of cloths given to you in PAT.  Sleep in a clean bed with clean clothing.  Do not allow pets to sleep with you.  • The morning of surgery shower using a fresh bar of anti-bacterial soap (such as Dial).  Use a clean washcloth and dry with a clean towel.  Then utilize the other package of the cloths given to you in PAT.  Dress in clean clothing.  • Do not use any cologne, deodorant, lotion or powder morning of surgery.   Ask your surgeon if you will be receiving antibiotics prior to surgery.  • Make sure you, your family, and all healthcare providers clean their hands with soap and water or an alcohol based hand  before caring for you or your wound.      Day of surgery: 02- ARRIVE @ 0545 AM REPORT TO [facility]   Your arrival time is approximately two hours before your scheduled surgery time.  Upon arrival, a Pre-op nurse and Anesthesiologist will review your health history, obtain vital signs, and answer questions you may have.  A Pre-op nurse will start an IV and you may receive medication in preparation for surgery, including something to help you relax.  Your family will be able to see you in the Pre-op area.  Two visitors at a time will be allowed in the Pre-op room.  While you are in surgery, your family should notify the waiting room  if they leave the waiting room area and provide a contact phone number.  If you are staying  overnight your family can leave your belongings in the car and bring them to your room later.  If applicable, we do ask that you have your C-PAP/BI-PAP machine available. It can be utilized the night of surgery.     Please be aware that surgery does come with discomfort.  We want to make every effort to control your discomfort so please discuss any uncontrolled symptoms with your nurse.   Your doctor will most likely have prescribed pain medications.      If you are going home after surgery you will receive individualized written care instructions before being discharged.  A responsible adult must drive you to and from the hospital on the day of your surgery and stay with you for 24 hours.    If you are staying overnight following surgery, you will be transported to your hospital room following the recovery period.  [facility] has all private rooms.    If you have any questions please call Pre-Admission Testing at [phone].  Deductibles and co-payments are collected on the day of service. Please be prepared to pay the required co-pay, deductible or deposit on the day of service as defined by your plan.  2% CHLORHEXIDINE GLUCONATE* CLOTH  Preparing or “prepping” skin before surgery can reduce the risk of infection at the surgical site. To make the process easier, [facility] has chosen disposable cloths moistened with a rinse-free, 2% Chlorhexidine Gluconate (CHG) antiseptic solution. The steps below outline the prepping process and should be carefully followed.        Use the prep cloth on the area that is circled in the diagram             Directions Night before Surgery 02- PM PRIOR TO SURGERY   1) Shower using a fresh bar of anti-bacterial soap (such as Dial) and clean washcloth.  Use a clean towel to completely dry your skin.  2) Do not use any lotions, oils or creams on your skin.  3) Open the package and remove 1 cloth, wipe your skin for 30 seconds in a circular  motion.  Allow to dry for 3 minutes.  4) Repeat #3 with second cloth.  5) Do not touch your eyes, ears, or mouth with the prep cloth.  6) Allow the wet prep solution to air dry.  7) Discard the prep cloth and wash your hands with soap and water.   8) Dress in clean bed clothes and sleep on fresh clean bed sheets.   9) You may experience some temporary itching after the prep.    Directions Day of Surgery 02- AM PRIOR TO SURGERY   1) Repeat steps 1,2,3,4,5,6,7, and 9.   2) Dress in clean clothes before coming to the hospital.

## 2020-01-30 ENCOUNTER — TELEPHONE (OUTPATIENT)
Dept: BARIATRICS/WEIGHT MGMT | Facility: CLINIC | Age: 65
End: 2020-01-30

## 2020-01-30 NOTE — TELEPHONE ENCOUNTER
Patient sent my chart message regarding some abnormal lab results. I spoke with Dr. Szymanski about elevated creatine levels. He suggested that the patient add some fluids in case of possible dehydration but it is most likely due to diuretic she is currently on. Patient stated understanding.     Instructed her to call back with any questions or concerns.

## 2020-02-10 ENCOUNTER — TELEPHONE (OUTPATIENT)
Dept: BARIATRICS/WEIGHT MGMT | Facility: CLINIC | Age: 65
End: 2020-02-10

## 2020-02-10 NOTE — TELEPHONE ENCOUNTER
----- Message from Wilbert Szymanski Jr., MD sent at 2/10/2020  4:11 PM EST -----  I talked to patient on the phone.  Patient states that she has stopped her Mobic and thinks that may be from her joint pain.  She denies fever chills diarrhea nausea or vomiting.  I instructed patient to take the Mobic and call her office tomorrow to let us know how she is doing.  If she is still feeling bad we will plan to cancel her case.  ----- Message -----  From: Aleyda Heart CMA  Sent: 2/10/2020   1:44 PM EST  To: Wilbert Szymanski Jr., MD    Pt is scheduled to have band removal on 2/12/20 Pt states she is feeling very achy all over, mostly in her joints. Pt states her throat hurts when swallowing as well. Pt is not sure what to do with it being so close to surgery

## 2020-02-12 ENCOUNTER — HOSPITAL ENCOUNTER (OUTPATIENT)
Facility: HOSPITAL | Age: 65
Setting detail: HOSPITAL OUTPATIENT SURGERY
Discharge: HOME | End: 2020-02-12
Attending: SURGERY | Admitting: SURGERY

## 2020-02-12 ENCOUNTER — ANESTHESIA EVENT (OUTPATIENT)
Dept: PERIOP | Facility: HOSPITAL | Age: 65
End: 2020-02-12

## 2020-02-12 ENCOUNTER — ANESTHESIA (OUTPATIENT)
Dept: PERIOP | Facility: HOSPITAL | Age: 65
End: 2020-02-12

## 2020-02-12 VITALS
TEMPERATURE: 97.6 F | SYSTOLIC BLOOD PRESSURE: 139 MMHG | OXYGEN SATURATION: 95 % | DIASTOLIC BLOOD PRESSURE: 78 MMHG | HEART RATE: 66 BPM | RESPIRATION RATE: 16 BRPM

## 2020-02-12 DIAGNOSIS — R11.2 NAUSEA AND VOMITING, INTRACTABILITY OF VOMITING NOT SPECIFIED, UNSPECIFIED VOMITING TYPE: Primary | ICD-10-CM

## 2020-02-12 DIAGNOSIS — K95.09 GASTRIC BAND MALFUNCTION: ICD-10-CM

## 2020-02-12 DIAGNOSIS — Z98.84 HISTORY OF LAPAROSCOPIC ADJUSTABLE GASTRIC BANDING: ICD-10-CM

## 2020-02-12 DIAGNOSIS — R13.19 OTHER DYSPHAGIA: ICD-10-CM

## 2020-02-12 LAB
ANION GAP SERPL CALCULATED.3IONS-SCNC: 17.4 MMOL/L (ref 5–15)
BUN BLD-MCNC: 14 MG/DL (ref 8–23)
BUN/CREAT SERPL: 12.7 (ref 7–25)
CALCIUM SPEC-SCNC: 9.8 MG/DL (ref 8.6–10.5)
CHLORIDE SERPL-SCNC: 101 MMOL/L (ref 98–107)
CO2 SERPL-SCNC: 22.6 MMOL/L (ref 22–29)
CREAT BLD-MCNC: 1.1 MG/DL (ref 0.57–1)
GFR SERPL CREATININE-BSD FRML MDRD: 50 ML/MIN/1.73
GLUCOSE BLD-MCNC: 94 MG/DL (ref 65–99)
POTASSIUM BLD-SCNC: 4.2 MMOL/L (ref 3.5–5.2)
SODIUM BLD-SCNC: 141 MMOL/L (ref 136–145)

## 2020-02-12 PROCEDURE — 25010000002 FENTANYL CITRATE (PF) 100 MCG/2ML SOLUTION: Performed by: NURSE ANESTHETIST, CERTIFIED REGISTERED

## 2020-02-12 PROCEDURE — 25010000002 DEXAMETHASONE PER 1 MG: Performed by: NURSE ANESTHETIST, CERTIFIED REGISTERED

## 2020-02-12 PROCEDURE — 25010000002 NEOSTIGMINE PER 0.5 MG: Performed by: NURSE ANESTHETIST, CERTIFIED REGISTERED

## 2020-02-12 PROCEDURE — 43774 LAP RMVL GASTR ADJ ALL PARTS: CPT | Performed by: NURSE PRACTITIONER

## 2020-02-12 PROCEDURE — 25010000002 PHENYLEPHRINE PER 1 ML: Performed by: NURSE ANESTHETIST, CERTIFIED REGISTERED

## 2020-02-12 PROCEDURE — 25010000002 ONDANSETRON PER 1 MG: Performed by: NURSE ANESTHETIST, CERTIFIED REGISTERED

## 2020-02-12 PROCEDURE — 25010000002 VANCOMYCIN 10 G RECONSTITUTED SOLUTION: Performed by: SURGERY

## 2020-02-12 PROCEDURE — 25010000002 METOCLOPRAMIDE PER 10 MG: Performed by: SURGERY

## 2020-02-12 PROCEDURE — 80048 BASIC METABOLIC PNL TOTAL CA: CPT | Performed by: SURGERY

## 2020-02-12 PROCEDURE — 25010000002 PROPOFOL 10 MG/ML EMULSION: Performed by: NURSE ANESTHETIST, CERTIFIED REGISTERED

## 2020-02-12 PROCEDURE — 25010000002 MIDAZOLAM PER 1 MG: Performed by: STUDENT IN AN ORGANIZED HEALTH CARE EDUCATION/TRAINING PROGRAM

## 2020-02-12 PROCEDURE — 43774 LAP RMVL GASTR ADJ ALL PARTS: CPT | Performed by: SURGERY

## 2020-02-12 RX ORDER — LIDOCAINE HYDROCHLORIDE 20 MG/ML
INJECTION, SOLUTION INFILTRATION; PERINEURAL AS NEEDED
Status: DISCONTINUED | OUTPATIENT
Start: 2020-02-12 | End: 2020-02-12 | Stop reason: SURG

## 2020-02-12 RX ORDER — PROPOFOL 10 MG/ML
VIAL (ML) INTRAVENOUS AS NEEDED
Status: DISCONTINUED | OUTPATIENT
Start: 2020-02-12 | End: 2020-02-12 | Stop reason: SURG

## 2020-02-12 RX ORDER — FENTANYL CITRATE 50 UG/ML
INJECTION, SOLUTION INTRAMUSCULAR; INTRAVENOUS AS NEEDED
Status: DISCONTINUED | OUTPATIENT
Start: 2020-02-12 | End: 2020-02-12 | Stop reason: SURG

## 2020-02-12 RX ORDER — DEXAMETHASONE SODIUM PHOSPHATE 10 MG/ML
INJECTION INTRAMUSCULAR; INTRAVENOUS AS NEEDED
Status: DISCONTINUED | OUTPATIENT
Start: 2020-02-12 | End: 2020-02-12 | Stop reason: SURG

## 2020-02-12 RX ORDER — PROMETHAZINE HYDROCHLORIDE 25 MG/1
25 SUPPOSITORY RECTAL ONCE AS NEEDED
Status: DISCONTINUED | OUTPATIENT
Start: 2020-02-12 | End: 2020-02-13 | Stop reason: HOSPADM

## 2020-02-12 RX ORDER — CHLORHEXIDINE GLUCONATE 0.12 MG/ML
15 RINSE ORAL
Status: COMPLETED | OUTPATIENT
Start: 2020-02-12 | End: 2020-02-12

## 2020-02-12 RX ORDER — DIPHENHYDRAMINE HCL 25 MG
25 CAPSULE ORAL
Status: DISCONTINUED | OUTPATIENT
Start: 2020-02-12 | End: 2020-02-13 | Stop reason: HOSPADM

## 2020-02-12 RX ORDER — SODIUM CHLORIDE, SODIUM LACTATE, POTASSIUM CHLORIDE, CALCIUM CHLORIDE 600; 310; 30; 20 MG/100ML; MG/100ML; MG/100ML; MG/100ML
100 INJECTION, SOLUTION INTRAVENOUS CONTINUOUS
Status: DISCONTINUED | OUTPATIENT
Start: 2020-02-12 | End: 2020-02-13 | Stop reason: HOSPADM

## 2020-02-12 RX ORDER — SODIUM CHLORIDE, SODIUM LACTATE, POTASSIUM CHLORIDE, CALCIUM CHLORIDE 600; 310; 30; 20 MG/100ML; MG/100ML; MG/100ML; MG/100ML
9 INJECTION, SOLUTION INTRAVENOUS CONTINUOUS
Status: DISCONTINUED | OUTPATIENT
Start: 2020-02-12 | End: 2020-02-13 | Stop reason: HOSPADM

## 2020-02-12 RX ORDER — BUPIVACAINE HYDROCHLORIDE AND EPINEPHRINE 5; 5 MG/ML; UG/ML
INJECTION, SOLUTION EPIDURAL; INTRACAUDAL; PERINEURAL AS NEEDED
Status: DISCONTINUED | OUTPATIENT
Start: 2020-02-12 | End: 2020-02-12 | Stop reason: HOSPADM

## 2020-02-12 RX ORDER — PANTOPRAZOLE SODIUM 40 MG/10ML
40 INJECTION, POWDER, LYOPHILIZED, FOR SOLUTION INTRAVENOUS ONCE
Status: COMPLETED | OUTPATIENT
Start: 2020-02-12 | End: 2020-02-12

## 2020-02-12 RX ORDER — DIPHENHYDRAMINE HYDROCHLORIDE 50 MG/ML
12.5 INJECTION INTRAMUSCULAR; INTRAVENOUS
Status: DISCONTINUED | OUTPATIENT
Start: 2020-02-12 | End: 2020-02-13 | Stop reason: HOSPADM

## 2020-02-12 RX ORDER — PROMETHAZINE HYDROCHLORIDE 25 MG/ML
12.5 INJECTION, SOLUTION INTRAMUSCULAR; INTRAVENOUS ONCE AS NEEDED
Status: DISCONTINUED | OUTPATIENT
Start: 2020-02-12 | End: 2020-02-13 | Stop reason: HOSPADM

## 2020-02-12 RX ORDER — HYDROCODONE BITARTRATE AND ACETAMINOPHEN 7.5; 325 MG/1; MG/1
1 TABLET ORAL ONCE AS NEEDED
Status: DISCONTINUED | OUTPATIENT
Start: 2020-02-12 | End: 2020-02-13 | Stop reason: HOSPADM

## 2020-02-12 RX ORDER — GLYCOPYRROLATE 0.2 MG/ML
INJECTION INTRAMUSCULAR; INTRAVENOUS AS NEEDED
Status: DISCONTINUED | OUTPATIENT
Start: 2020-02-12 | End: 2020-02-12 | Stop reason: SURG

## 2020-02-12 RX ORDER — SODIUM CHLORIDE 0.9 % (FLUSH) 0.9 %
3 SYRINGE (ML) INJECTION EVERY 12 HOURS SCHEDULED
Status: DISCONTINUED | OUTPATIENT
Start: 2020-02-12 | End: 2020-02-12 | Stop reason: HOSPADM

## 2020-02-12 RX ORDER — ACETAMINOPHEN 325 MG/1
650 TABLET ORAL ONCE AS NEEDED
Status: DISCONTINUED | OUTPATIENT
Start: 2020-02-12 | End: 2020-02-13 | Stop reason: HOSPADM

## 2020-02-12 RX ORDER — HYDRALAZINE HYDROCHLORIDE 20 MG/ML
5 INJECTION INTRAMUSCULAR; INTRAVENOUS
Status: DISCONTINUED | OUTPATIENT
Start: 2020-02-12 | End: 2020-02-13 | Stop reason: HOSPADM

## 2020-02-12 RX ORDER — ROCURONIUM BROMIDE 10 MG/ML
INJECTION, SOLUTION INTRAVENOUS AS NEEDED
Status: DISCONTINUED | OUTPATIENT
Start: 2020-02-12 | End: 2020-02-12 | Stop reason: SURG

## 2020-02-12 RX ORDER — MIDAZOLAM HYDROCHLORIDE 1 MG/ML
1 INJECTION INTRAMUSCULAR; INTRAVENOUS
Status: DISCONTINUED | OUTPATIENT
Start: 2020-02-12 | End: 2020-02-12 | Stop reason: HOSPADM

## 2020-02-12 RX ORDER — ONDANSETRON 2 MG/ML
INJECTION INTRAMUSCULAR; INTRAVENOUS AS NEEDED
Status: DISCONTINUED | OUTPATIENT
Start: 2020-02-12 | End: 2020-02-12 | Stop reason: SURG

## 2020-02-12 RX ORDER — MAGNESIUM HYDROXIDE 1200 MG/15ML
LIQUID ORAL AS NEEDED
Status: DISCONTINUED | OUTPATIENT
Start: 2020-02-12 | End: 2020-02-12 | Stop reason: HOSPADM

## 2020-02-12 RX ORDER — HYDROMORPHONE HYDROCHLORIDE 1 MG/ML
0.5 INJECTION, SOLUTION INTRAMUSCULAR; INTRAVENOUS; SUBCUTANEOUS
Status: DISCONTINUED | OUTPATIENT
Start: 2020-02-12 | End: 2020-02-13 | Stop reason: HOSPADM

## 2020-02-12 RX ORDER — EPHEDRINE SULFATE 50 MG/ML
5 INJECTION, SOLUTION INTRAVENOUS ONCE AS NEEDED
Status: DISCONTINUED | OUTPATIENT
Start: 2020-02-12 | End: 2020-02-13 | Stop reason: HOSPADM

## 2020-02-12 RX ORDER — PROMETHAZINE HYDROCHLORIDE 25 MG/1
25 TABLET ORAL ONCE AS NEEDED
Status: DISCONTINUED | OUTPATIENT
Start: 2020-02-12 | End: 2020-02-13 | Stop reason: HOSPADM

## 2020-02-12 RX ORDER — ACETAMINOPHEN 650 MG/1
650 SUPPOSITORY RECTAL ONCE AS NEEDED
Status: DISCONTINUED | OUTPATIENT
Start: 2020-02-12 | End: 2020-02-13 | Stop reason: HOSPADM

## 2020-02-12 RX ORDER — NALOXONE HCL 0.4 MG/ML
0.2 VIAL (ML) INJECTION AS NEEDED
Status: DISCONTINUED | OUTPATIENT
Start: 2020-02-12 | End: 2020-02-13 | Stop reason: HOSPADM

## 2020-02-12 RX ORDER — ACETAMINOPHEN 160 MG/5ML
975 SOLUTION ORAL ONCE
Status: COMPLETED | OUTPATIENT
Start: 2020-02-12 | End: 2020-02-12

## 2020-02-12 RX ORDER — SODIUM CHLORIDE 0.9 % (FLUSH) 0.9 %
3-10 SYRINGE (ML) INJECTION AS NEEDED
Status: DISCONTINUED | OUTPATIENT
Start: 2020-02-12 | End: 2020-02-12 | Stop reason: HOSPADM

## 2020-02-12 RX ORDER — LABETALOL HYDROCHLORIDE 5 MG/ML
5 INJECTION, SOLUTION INTRAVENOUS
Status: DISCONTINUED | OUTPATIENT
Start: 2020-02-12 | End: 2020-02-13 | Stop reason: HOSPADM

## 2020-02-12 RX ORDER — PROMETHAZINE HYDROCHLORIDE 25 MG/ML
6.25 INJECTION, SOLUTION INTRAMUSCULAR; INTRAVENOUS
Status: DISCONTINUED | OUTPATIENT
Start: 2020-02-12 | End: 2020-02-13 | Stop reason: HOSPADM

## 2020-02-12 RX ORDER — SODIUM CHLORIDE 0.9 % (FLUSH) 0.9 %
1-10 SYRINGE (ML) INJECTION AS NEEDED
Status: DISCONTINUED | OUTPATIENT
Start: 2020-02-12 | End: 2020-02-12 | Stop reason: HOSPADM

## 2020-02-12 RX ORDER — FLUMAZENIL 0.1 MG/ML
0.2 INJECTION INTRAVENOUS AS NEEDED
Status: DISCONTINUED | OUTPATIENT
Start: 2020-02-12 | End: 2020-02-13 | Stop reason: HOSPADM

## 2020-02-12 RX ORDER — OXYCODONE AND ACETAMINOPHEN 7.5; 325 MG/1; MG/1
1 TABLET ORAL ONCE AS NEEDED
Status: DISCONTINUED | OUTPATIENT
Start: 2020-02-12 | End: 2020-02-13 | Stop reason: HOSPADM

## 2020-02-12 RX ORDER — FENTANYL CITRATE 50 UG/ML
50 INJECTION, SOLUTION INTRAMUSCULAR; INTRAVENOUS
Status: DISCONTINUED | OUTPATIENT
Start: 2020-02-12 | End: 2020-02-13 | Stop reason: HOSPADM

## 2020-02-12 RX ORDER — METOCLOPRAMIDE HYDROCHLORIDE 5 MG/ML
10 INJECTION INTRAMUSCULAR; INTRAVENOUS ONCE
Status: COMPLETED | OUTPATIENT
Start: 2020-02-12 | End: 2020-02-12

## 2020-02-12 RX ORDER — ONDANSETRON 2 MG/ML
4 INJECTION INTRAMUSCULAR; INTRAVENOUS ONCE AS NEEDED
Status: DISCONTINUED | OUTPATIENT
Start: 2020-02-12 | End: 2020-02-13 | Stop reason: HOSPADM

## 2020-02-12 RX ORDER — MIDAZOLAM HYDROCHLORIDE 1 MG/ML
2 INJECTION INTRAMUSCULAR; INTRAVENOUS
Status: DISCONTINUED | OUTPATIENT
Start: 2020-02-12 | End: 2020-02-12 | Stop reason: HOSPADM

## 2020-02-12 RX ORDER — SCOLOPAMINE TRANSDERMAL SYSTEM 1 MG/1
1 PATCH, EXTENDED RELEASE TRANSDERMAL CONTINUOUS
Status: DISCONTINUED | OUTPATIENT
Start: 2020-02-12 | End: 2020-02-13 | Stop reason: HOSPADM

## 2020-02-12 RX ORDER — LIDOCAINE HYDROCHLORIDE 10 MG/ML
0.5 INJECTION, SOLUTION EPIDURAL; INFILTRATION; INTRACAUDAL; PERINEURAL ONCE AS NEEDED
Status: DISCONTINUED | OUTPATIENT
Start: 2020-02-12 | End: 2020-02-12 | Stop reason: HOSPADM

## 2020-02-12 RX ADMIN — ACETAMINOPHEN ORAL SOLUTION 975 MG: 325 SOLUTION ORAL at 06:01

## 2020-02-12 RX ADMIN — SODIUM CHLORIDE, POTASSIUM CHLORIDE, SODIUM LACTATE AND CALCIUM CHLORIDE: 600; 310; 30; 20 INJECTION, SOLUTION INTRAVENOUS at 06:52

## 2020-02-12 RX ADMIN — METOCLOPRAMIDE 10 MG: 5 INJECTION, SOLUTION INTRAMUSCULAR; INTRAVENOUS at 06:40

## 2020-02-12 RX ADMIN — PROPOFOL 200 MG: 10 INJECTION, EMULSION INTRAVENOUS at 07:01

## 2020-02-12 RX ADMIN — GLYCOPYRROLATE 0.2 MG: 0.2 INJECTION INTRAMUSCULAR; INTRAVENOUS at 07:19

## 2020-02-12 RX ADMIN — AZTREONAM 2 G: 2 INJECTION, POWDER, FOR SOLUTION INTRAMUSCULAR; INTRAVENOUS at 06:56

## 2020-02-12 RX ADMIN — SCOPALAMINE 1 PATCH: 1 PATCH, EXTENDED RELEASE TRANSDERMAL at 06:06

## 2020-02-12 RX ADMIN — DEXAMETHASONE SODIUM PHOSPHATE 8 MG: 10 INJECTION INTRAMUSCULAR; INTRAVENOUS at 07:14

## 2020-02-12 RX ADMIN — LIDOCAINE HYDROCHLORIDE 80 MG: 20 INJECTION, SOLUTION INFILTRATION; PERINEURAL at 07:01

## 2020-02-12 RX ADMIN — PHENYLEPHRINE HYDROCHLORIDE 100 MCG: 10 INJECTION INTRAVENOUS at 07:12

## 2020-02-12 RX ADMIN — SODIUM CHLORIDE 2000 MG: 9 INJECTION, SOLUTION INTRAVENOUS at 06:41

## 2020-02-12 RX ADMIN — MIDAZOLAM 1 MG: 1 INJECTION INTRAMUSCULAR; INTRAVENOUS at 06:43

## 2020-02-12 RX ADMIN — SODIUM CHLORIDE, POTASSIUM CHLORIDE, SODIUM LACTATE AND CALCIUM CHLORIDE 500 ML: 600; 310; 30; 20 INJECTION, SOLUTION INTRAVENOUS at 06:30

## 2020-02-12 RX ADMIN — PANTOPRAZOLE SODIUM 40 MG: 40 INJECTION, POWDER, FOR SOLUTION INTRAVENOUS at 06:31

## 2020-02-12 RX ADMIN — ROCURONIUM BROMIDE 30 MG: 10 INJECTION, SOLUTION INTRAVENOUS at 07:01

## 2020-02-12 RX ADMIN — CHLORHEXIDINE GLUCONATE 15 ML: 1.2 RINSE ORAL at 06:02

## 2020-02-12 RX ADMIN — Medication 3 MG: at 07:29

## 2020-02-12 RX ADMIN — HYDROCODONE BITARTRATE AND ACETAMINOPHEN 15 ML: 7.5; 325 SOLUTION ORAL at 08:52

## 2020-02-12 RX ADMIN — ONDANSETRON HYDROCHLORIDE 4 MG: 2 SOLUTION INTRAMUSCULAR; INTRAVENOUS at 07:28

## 2020-02-12 RX ADMIN — GLYCOPYRROLATE 0.4 MG: 0.2 INJECTION INTRAMUSCULAR; INTRAVENOUS at 07:29

## 2020-02-12 RX ADMIN — FENTANYL CITRATE 50 MCG: 50 INJECTION INTRAMUSCULAR; INTRAVENOUS at 07:01

## 2020-02-12 NOTE — ANESTHESIA PROCEDURE NOTES
Airway  Urgency: elective    Date/Time: 2/12/2020 7:04 AM  Airway not difficult    General Information and Staff    Patient location during procedure: OR  Anesthesiologist: Hardy Argueta MD  CRNA: Yulissa Pittman CRNA    Indications and Patient Condition  Indications for airway management: airway protection    Preoxygenated: yes  MILS not maintained throughout  Mask difficulty assessment: 2 - vent by mask + OA or adjuvant +/- NMBA    Final Airway Details  Final airway type: endotracheal airway      Successful airway: ETT  Cuffed: yes   Successful intubation technique: direct laryngoscopy  Endotracheal tube insertion site: oral  Blade: Alysha  Blade size: 3  ETT size (mm): 7.0  Cormack-Lehane Classification: grade I - full view of glottis  Placement verified by: chest auscultation and capnometry   Measured from: lips  ETT/EBT  to lips (cm): 22  Number of attempts at approach: 1  Assessment: lips, teeth, and gum same as pre-op and atraumatic intubation

## 2020-02-12 NOTE — H&P
[facility] BARIATRIC SURGERY  [address] [address]  [phone]  [address] [address]  [phone]  Dept: [phone]  1/2/2020        Rosanne Adair.  [id number]  [id number]  1955  female             Chief Complaint   Patient presents with   • Follow-up       EGD FOLLOW UP         BH Post-Op Bariatric Surgery:   Rosanne Adair is status post Lapband procedure, performed on 2/24/09.      HPI:   Today's weight is 136 kg (299 lb)  pounds, today's BMI is Body mass index is 42.9 kg/m².. The patient reports difficulty with solid foods.     Patient admits to nausea and dysphagia. The patient denies abdominal pain. The patientdoes have vomitng. The patientdoes have reflux.     Diet and Exercise: Diet history reviewed and discussed with the patient. Weight loss/gains to date discussed with the patient. She reports eating 3 meals per day, a typical portion size of 1 cup, eating 2 snack per day, drinking 4 8-oz. glasses of water per day. The patient cannot tolerate solid protein.   The patient is not eating protein first. The patient is limiting food volume. The patient is taking vitamins. The patient is limiting snacking.     The following portions of the patient's history were reviewed and updated as appropriate: allergies, current medications, past family history, past medical history, past social history, past surgical history and problem list.         Vitals:     01/02/20 1450   BP: 149/75   Pulse: 69   Resp: 18   Temp: 97.3 °F (36.3 °C)         Review of Systems   Constitutional: Positive for fatigue.   Gastrointestinal: Positive for abdominal pain, nausea and vomiting.   Musculoskeletal: Positive for arthralgias and back pain.   All other systems reviewed and are negative.        Physical Exam   Constitutional: She is oriented to person, place, and time. She appears well-nourished.   HENT:   Head: Normocephalic  and atraumatic.   Mouth/Throat: Oropharynx is clear and moist.   Eyes: Pupils are equal, round, and reactive to light. Conjunctivae and EOM are normal. No scleral icterus.   Neck: Normal range of motion. Neck supple. No thyromegaly present.   Cardiovascular: Normal rate and regular rhythm.   Pulmonary/Chest: Effort normal and breath sounds normal.   Abdominal: Soft. Bowel sounds are normal. She exhibits no distension. There is no rebound. No hernia.   Mild tenderness port site left upper quadrant   Musculoskeletal: Normal range of motion.   Lymphadenopathy:     She has no cervical adenopathy.   Neurological: She is alert and oriented to person, place, and time. No cranial nerve deficit. Coordination normal.   Skin: Skin is warm and dry. No erythema.   Psychiatric: She has a normal mood and affect. Her behavior is normal.   Vitals reviewed.        Assessment: Post-operatively the patient status post lap band February 2009 by Dr. Resendiz at [facility] with complaints of dysphagia and heartburn as well as port site pain.  Upper endoscopy did reveal pouch dilatation.  Patient would like to proceed with band removal.  The risks and benefits of the procedure were discussed with the patient in detail and all questions were answered.  Possibility of open, bleeding, infection, bowel injury, deep venous thrombosis, pulmonary embolism, incisional hernias, renal failure, myocardial infarction, respiratory and cardiac arrest and death were discussed. Consent will be signed and witnessed.          Encounter Diagnoses   Name Primary?   • Obesity, Class III, BMI 40-49.9 (morbid obesity) (CMS/[facility]) Yes   • Nausea and vomiting, intractability of vomiting not specified, unspecified vomiting type     • Other dysphagia     • Dyspepsia     • History of laparoscopic adjustable gastric banding     • Gastric band malfunction           Plan:    My impression is Rosanne Adair has a problem of her band as listed above.  I think she would benefit  from band removal.      Reviewed the importance of being able to tolerate dense/solid protein and vegetables for weight loss. Discussed eating foods that are easier to tolerate, softer foods, usually contribute to weight gain because they are higher calorie/carb and will not keep patient full for the recommended 3-4 hours.       She should follow-up after band removal.       UGI discussed with patient in detail and all questions answered  EGD nkechi with patient in detail and all questions answered.  Patient was noted to have gastritis and H. pylori negative.  Patient will take an H2 blocker PPI over-the-counter.  Also instructed about taking nonsteroidals like her mobic. She will discuss this with her primary care physician.     Approximately 25 minutes was spent with the patient and over 20 minutes spent counseling.     Activity restrictions: None.   Instructions / Recommendations: dietary counseling recommended, recommended a daily protein intake of  grams, patient was advised that the lap band system works best when consuming solid foods, vitamin supplement(s) recommended, recommended exercising at least 150 minutes per week, behavior modifications recommended and instructed to call the office for concerns, questions, or problems.

## 2020-02-12 NOTE — ANESTHESIA POSTPROCEDURE EVALUATION
Patient: Rosanne Adair    Procedure Summary     Date:  02/12/20 Room / Location:   [facility]  /  [facility]    Anesthesia Start:  0653 Anesthesia Stop:  0747    Procedure:  GASTRIC BANDING REMOVAL LAPAROSCOPIC, PORT REMOVAL, AND LYSIS OF ADHESIONS (N/A Abdomen) Diagnosis:       Other dysphagia      (Other dysphagia [R13.19])    Surgeon:  Wilbert Szymanski Jr., MD Provider:  Hardy Argueta MD    Anesthesia Type:  general ASA Status:  3          Anesthesia Type: general    Vitals  Vitals Value Taken Time   /65 2/12/2020  8:45 AM   Temp 36.4 °C (97.6 °F) 2/12/2020  8:45 AM   Pulse 72 2/12/2020  8:45 AM   Resp 14 2/12/2020  8:45 AM   SpO2 100 % 2/12/2020  8:46 AM   Vitals shown include unvalidated device data.        Post Anesthesia Care and Evaluation    Patient location during evaluation: bedside  Patient participation: complete - patient participated  Level of consciousness: awake and alert  Pain management: adequate  Airway patency: patent  Anesthetic complications: No anesthetic complications  PONV Status: controlled  Cardiovascular status: blood pressure returned to baseline and acceptable  Respiratory status: acceptable  Hydration status: acceptable

## 2020-02-12 NOTE — OP NOTE
Surgeon: Wilbert Szymanski Jr., M.D.    Assistant: TERESA Campbell RNFA    Pre-Operative Diagnosis: Chronic dysphagia status post adjustable gastric band    Post-Operative Diagnosis: 1. Same  2. Adhesions upper abdomen 3. Abnormal gastric pouch size    Procedure Performed: Laparoscopic adjustable gastric band and port removal with lysis of adhesions    Anesthesia: GETA    Specimens: Adjustable gastric band and port inspected then discarded    EBL: less than 10 ml    Fluids:  500 ml crystalloid    Complications: None    Surgery assisted and facilitated by a certified physician assistant, who directly resulted in a decreased operative time, anesthetic time, wound exposure, and possibly of an operative wound infection, thereby decreasing patient morbidity and ultimately total expenditures.     Indications:   Patient is status post adjustable gastric band placement with chronic dysphagia who now presents for elective removal. The risks and benefits of the procedure were discussed with the patient in detail and all questions were answered.  Possibility of open, bleeding, infection, bowel injury, deep venous thrombosis, pulmonary embolism, incisional hernias, renal failure, myocardial infarction, respiratory and cardiac arrest and death were discussed. Consent was signed and witnessed.    Procedure:   Patient was identified and after informed consent was obtained the patient was taken to the operating room and placed in the supine position. Patient was given preoperative antibiotic and SCDs were placed by the nursing staff.  After adequate general anesthesia the abdomen was prepped with choroprep and draped in the usual sterile fashion. The previous incisions were marked with indelible ink. An Ioban was placed. Using a 5 mm Visiport trocar and 5 mm 0 degree laparoscope the abdomen was entered without difficulty and a pneumoperitoneum was established with good opening pressures. General laparoscopic evaluation of the  abdominal cavity revealed no injury upon entry with the trocar.  A 10 mm trocar was placed at the port site incision under direct visualization and then 2  5 mm trochars were placed in the right upper quadrant and left upper quadrant under direct visualization.  The left lobe of the liver was elevated with a grasper. The adhesions overlying the buckle of the band were taken down with the electrocautery. The buckle was identified and opened up without difficulty. The adhesions overlying the band laterally were taken down with electrocautery. Careful attention was made not to injure the stomach. Sutures were removed with the scissors. The tubing of the band was cut with the scissors and the band was pulled from around the stomach without any difficulty. The lapband was taken out of the 10-11 trocar site. The stomach was inspected prior to removing the adjustable gastric band and no evidence of erosion was noted. There was a capsule noted at the previous adjustable gastric band site and this was opened up with the scissors and taken down. The band was inspected and no discoloration was noted. The gastric band was then discarded. The trocars were then removed under direct visualization. No bleeding was noted. The pneumoperitoneum was desufflated. The incisions were then infiltrated with 0.5% marcaine with epinephrine. A total of 60 ml was used throughout the case.  The incision at the port site was then carried down to the port with electrocautery. The port was Identified and the tubing was brouht up with a right angle clamp. Sutures were cut and removed. The port was removed without difficulty. The port was inspected and no abnormalities were seen. The incision was also infiltrated with the local anesthetic. The wound was irrigated with saline and dried. No bleeding was noted. The subcutaneous tissue was reapproximated with a 2-0 vicryl in an interrupted fashion and skin incisions closed with a 4-0 Monocryl in a  subcuticular fashion. The areas were then cleaned and dried and Dermabond was placed. The patient tolerated the procedure well and left operating room in good condition. Sponges and needles were counted and reported as correct.

## 2020-02-12 NOTE — ANESTHESIA PREPROCEDURE EVALUATION
Anesthesia Evaluation     Patient summary reviewed and Nursing notes reviewed   no history of anesthetic complications:  NPO Solid Status: > 8 hours  NPO Liquid Status: > 2 hours           Airway   Mallampati: II  TM distance: >3 FB  Neck ROM: full  No difficulty expected  Comment: Neck circumference WNL, airway reassuring  Dental - normal exam     Pulmonary     breath sounds clear to auscultation  (+) asthma,  Cardiovascular   Exercise tolerance: good (4-7 METS)    Rhythm: regular  Rate: normal    (+) hypertension, hyperlipidemia,       Neuro/Psych  GI/Hepatic/Renal/Endo    (+) morbid obesity,      Musculoskeletal     Abdominal     Abdomen: soft.   Substance History      OB/GYN          Other                        Anesthesia Plan    ASA 3     general     intravenous induction     Anesthetic plan, all risks, benefits, and alternatives have been provided, discussed and informed consent has been obtained with: patient.    Plan discussed with CRNA.

## 2020-02-25 ENCOUNTER — OFFICE VISIT (OUTPATIENT)
Dept: BARIATRICS/WEIGHT MGMT | Facility: CLINIC | Age: 65
End: 2020-02-25

## 2020-02-25 VITALS
HEIGHT: 70 IN | TEMPERATURE: 97.9 F | WEIGHT: 288 LBS | BODY MASS INDEX: 41.23 KG/M2 | DIASTOLIC BLOOD PRESSURE: 77 MMHG | SYSTOLIC BLOOD PRESSURE: 156 MMHG | HEART RATE: 64 BPM | RESPIRATION RATE: 18 BRPM

## 2020-02-25 DIAGNOSIS — Z98.84 HISTORY OF REMOVAL OF LAPAROSCOPIC GASTRIC BANDING DEVICE: ICD-10-CM

## 2020-02-25 DIAGNOSIS — Z98.84 HISTORY OF LAPAROSCOPIC ADJUSTABLE GASTRIC BANDING: ICD-10-CM

## 2020-02-25 DIAGNOSIS — R11.2 NAUSEA AND VOMITING, INTRACTABILITY OF VOMITING NOT SPECIFIED, UNSPECIFIED VOMITING TYPE: ICD-10-CM

## 2020-02-25 DIAGNOSIS — R13.19 OTHER DYSPHAGIA: ICD-10-CM

## 2020-02-25 DIAGNOSIS — E66.01 OBESITY, CLASS III, BMI 40-49.9 (MORBID OBESITY) (HCC): Primary | ICD-10-CM

## 2020-02-25 DIAGNOSIS — K95.09 GASTRIC BAND MALFUNCTION: ICD-10-CM

## 2020-02-25 PROBLEM — G47.33 OBSTRUCTIVE SLEEP APNEA: Status: ACTIVE | Noted: 2020-02-25

## 2020-02-25 PROCEDURE — 99024 POSTOP FOLLOW-UP VISIT: CPT | Performed by: NURSE PRACTITIONER

## 2020-02-25 NOTE — PROGRESS NOTES
Answers for HPI/ROS submitted by the patient on 2/19/2020   What is the primary reason for your visit?: Other  Have you had these symptoms before?: No    Conflicting answers have been found for some questions. Please document the patient's answers manually.   [facility]  [address] [address] [phone]  [address] [address] [phone]  Dept: [phone]  2/25/2020      Rosanne Adair.  [id number]  [id number]  1955  female      Chief Complaint   Patient presents with   • Post-op     1 week post op sleeve       BH Post-Op Bariatric Surgery:   Rosanne Adair is status post laparopscopic Laparoscopic Band Removal procedure, performed on 2/12/20.     HPI:   Today's weight is 131 kg (288 lb) pounds, today's BMI is Body mass index is 41.32 kg/m²., she has a  loss of 11 pounds since the last visit and@ weight loss since surgery is 11 pounds. The patient reports a decreased portion size and loss of appetite.  Rosanne Adair denies nausea, vomiting, reflux, dysphagia and reports that she has been trying to increase her fluid intake. The patient c/o appropriate post-op incisional discomfort that is improving. she is doing well with protein and water intake so far. Taking their vitamins, walking and using IS. Denies fevers, chills, chest pain or shortness of air.     She was having a lot of dysphagia, vomiting, and regurgitation. She reports that she can swallow freely.      Diet and Exercise: Diet history reviewed and discussed with the patient. Weight loss/gains to date discussed with the patient. No carbonated beverage consumption and exercising regularly- walking frequently.   Supplements: multivitamins, B-12, calcium, iron, B-1 and Vitamin D.     Review of Systems   Constitutional: Positive for appetite change. Negative for fatigue and unexpected weight change.   HENT: Negative.    Eyes:  Negative.    Respiratory: Negative.    Cardiovascular: Negative.  Negative for leg swelling.   Gastrointestinal: Positive for constipation. Negative for abdominal distention, abdominal pain, diarrhea, nausea and vomiting.   Genitourinary: Negative for difficulty urinating, frequency and urgency.   Musculoskeletal: Negative for back pain.   Skin: Negative.    Psychiatric/Behavioral: Negative.    All other systems reviewed and are negative.      Patient Active Problem List   Diagnosis   • Other dysphagia   • N&V (nausea and vomiting)   • Obesity, Class III, BMI 40-49.9 (morbid obesity) (CMS/[facility], located within [facility])   • Dyspepsia   • History of laparoscopic adjustable gastric banding   • Gastric band malfunction   • Essential hypertension   • Obstructive sleep apnea   • History of removal of laparoscopic gastric banding device       The following portions of the patient's history were reviewed and updated as appropriate: allergies, current medications, past family history, past medical history, past social history, past surgical history and problem list.    Vitals:    02/25/20 1425   BP: 156/77   Pulse: 64   Resp: 18   Temp: 97.9 °F (36.6 °C)       Physical Exam   Constitutional: She appears well-developed and well-nourished.   Neck: No thyromegaly present.   Cardiovascular: Normal rate, regular rhythm and normal heart sounds.   Pulmonary/Chest: Effort normal and breath sounds normal. No respiratory distress. She has no wheezes.   Abdominal: Soft. Bowel sounds are normal. She exhibits no distension. There is no tenderness. There is no guarding. No hernia.   Musculoskeletal: She exhibits no edema or tenderness.   Neurological: She is alert.   Skin: Skin is warm and dry. No rash noted. No erythema.   Psychiatric: She has a normal mood and affect. Her behavior is normal.   Nursing note and vitals reviewed.      Assessment:   Post-op, the patient is doing well.     Encounter Diagnoses   Name Primary?   • Obesity, Class III, BMI 40-49.9  (morbid obesity) (CMS/HCC) Yes   • Gastric band malfunction    • History of laparoscopic adjustable gastric banding    • Other dysphagia    • Nausea and vomiting, intractability of vomiting not specified, unspecified vomiting type    • History of removal of laparoscopic gastric banding device        Plan:   Reviewed with patient the importance of following the manual for diet progression. Increase activity as tolerated. Continue increasing daily intake of protein and water.   Return to work: the patient is to return to 3 weeks from their surgery date with no restrictions unless they develop medical problems in which we will see them back in the office. They received a note in our office today with their return to work date.  Activity restrictions: no lifting, pushing or pulling over 25lbs for 3 weeks.   Recommended patient be sure to get at least 70 grams of protein per day. Discussed with the patient the recommended amount of water per day to intake. Reviewed vitamin requirements. Be sure to do routine exercise and increase activity as tolerated. No asa, nsaids or steroids for 8 weeks. Patient may use miralax as needed if necessary.     Instructions / Recommendations: dietary counseling recommended, recommended a daily protein intake of  grams, vitamin supplement(s) recommended, recommended exercising at least 150 minutes per week, behavior modifications recommended and instructed to call the office for concerns, questions, or problems.     The patient was instructed to follow up at one month follow up appt.     The patient was counseled regarding post op bariatric manual

## 2020-03-17 ENCOUNTER — OFFICE VISIT (OUTPATIENT)
Dept: BARIATRICS/WEIGHT MGMT | Facility: CLINIC | Age: 65
End: 2020-03-17

## 2020-03-17 VITALS
BODY MASS INDEX: 40.37 KG/M2 | DIASTOLIC BLOOD PRESSURE: 81 MMHG | TEMPERATURE: 97.6 F | SYSTOLIC BLOOD PRESSURE: 140 MMHG | HEART RATE: 68 BPM | WEIGHT: 282 LBS | HEIGHT: 70 IN | RESPIRATION RATE: 18 BRPM

## 2020-03-17 DIAGNOSIS — Z98.84 HISTORY OF REMOVAL OF LAPAROSCOPIC GASTRIC BANDING DEVICE: ICD-10-CM

## 2020-03-17 DIAGNOSIS — E66.01 OBESITY, CLASS III, BMI 40-49.9 (MORBID OBESITY) (HCC): Primary | ICD-10-CM

## 2020-03-17 PROCEDURE — 99024 POSTOP FOLLOW-UP VISIT: CPT | Performed by: NURSE PRACTITIONER

## 2020-03-17 NOTE — PROGRESS NOTES
[facility] BARIATRIC SURGERY  [address] [facility] [address]  [facility] [address]  [phone]  [address] [address]  [phone]  Dept: [phone]  3/17/2020      Rosanne Adair.  [id number]  [id number]  1955  female      Chief Complaint   Patient presents with   • Follow-up     1 month post op AGB Removal       BH Post-Op Bariatric Surgery:   Rosanne Adair is status post laparopscopic Laparoscopic Band Removal procedure, performed on 2/12/20.     HPI:   Today's weight is 128 kg (282 lb) pounds, today's BMI is Body mass index is 40.46 kg/m².,@ has a  loss of 6 pounds since the last visit. The patient reports a decreased portion size and loss of appetite.  Rosanne Adair denies n/v/d/regurg/reflux/pain. she is doing well with protein and water intake so far- tolerating an advanced diet without any problems. Denies fevers, chills, chest pain or shortness of air.      Diet and Exercise: Diet history reviewed and discussed with the patient. Weight loss/gains to date discussed with the patient. No carbonated beverage consumption and exercising regularly- walking frequently.   Supplements: Vitamin D.     Review of Systems   All other systems reviewed and are negative.      Patient Active Problem List   Diagnosis   • Other dysphagia   • N&V (nausea and vomiting)   • Obesity, Class III, BMI 40-49.9 (morbid obesity) (CMS/HCC)   • Dyspepsia   • History of laparoscopic adjustable gastric banding   • Gastric band malfunction   • Essential hypertension   • Obstructive sleep apnea   • History of removal of laparoscopic gastric banding device       The following portions of the patient's history were reviewed and updated as appropriate: allergies, current medications, past family history, past medical history, past social history, past surgical history and problem list.    Vitals:    03/17/20 1325   BP: 140/81   Pulse: 68   Resp: 18   Temp: 97.6 °F  (36.4 °C)       Physical Exam   Constitutional: She is oriented to person, place, and time. She appears well-developed and well-nourished.   HENT:   Head: Normocephalic and atraumatic.   Eyes: EOM are normal.   Cardiovascular: Normal rate.   Pulmonary/Chest: Effort normal.   Abdominal: Soft. She exhibits no distension. There is no tenderness.   Musculoskeletal: Normal range of motion.   Neurological: She is alert and oriented to person, place, and time.   Skin: Skin is warm and dry.   Psychiatric: She has a normal mood and affect. Her behavior is normal. Judgment and thought content normal.   Vitals reviewed.      Assessment:   Post-op, the patient has recovered well.     Encounter Diagnoses   Name Primary?   • Obesity, Class III, BMI 40-49.9 (morbid obesity) (CMS/[facility]) Yes   • History of removal of laparoscopic gastric banding device        Plan:   Reviewed with patient the importance of healthy lifestyle habits like eating lean protein and produce, plenty of water and regular physical activity. Increase activity as tolerated.   Activity restrictions: none.   Recommended patient be sure to get at least 70 grams of protein per day. Discussed with the patient the recommended amount of water per day to intake. Reviewed vitamin requirements. Be sure to do routine exercise and increase activity as tolerated.      Instructions / Recommendations: dietary counseling recommended, recommended a daily protein intake of  grams, vitamin supplement(s) recommended, recommended exercising at least 150 minutes per week, behavior modifications recommended and instructed to call the office for concerns, questions, or problems.     The patient was instructed to follow up if needed    The patient was counseled regarding

## 2021-01-08 ENCOUNTER — OFFICE VISIT (OUTPATIENT)
Dept: CARDIOLOGY | Facility: CLINIC | Age: 66
End: 2021-01-08

## 2021-01-08 VITALS
DIASTOLIC BLOOD PRESSURE: 84 MMHG | BODY MASS INDEX: 38.57 KG/M2 | WEIGHT: 269.4 LBS | SYSTOLIC BLOOD PRESSURE: 130 MMHG | HEART RATE: 62 BPM | HEIGHT: 70 IN

## 2021-01-08 DIAGNOSIS — I10 ESSENTIAL HYPERTENSION: ICD-10-CM

## 2021-01-08 DIAGNOSIS — R93.1 AGATSTON CAC SCORE, <100: ICD-10-CM

## 2021-01-08 DIAGNOSIS — R00.1 BRADYCARDIA, SINUS: ICD-10-CM

## 2021-01-08 DIAGNOSIS — I87.2 CHRONIC VENOUS INSUFFICIENCY: ICD-10-CM

## 2021-01-08 DIAGNOSIS — R42 EPISODIC LIGHTHEADEDNESS: Primary | ICD-10-CM

## 2021-01-08 DIAGNOSIS — I49.49 ECTOPIC BEATS: ICD-10-CM

## 2021-01-08 PROCEDURE — 93000 ELECTROCARDIOGRAM COMPLETE: CPT | Performed by: INTERNAL MEDICINE

## 2021-01-08 PROCEDURE — 99204 OFFICE O/P NEW MOD 45 MIN: CPT | Performed by: INTERNAL MEDICINE

## 2021-01-08 NOTE — PROGRESS NOTES
Date of Office Visit: 2021  Encounter Provider: Johnathan Mckay MD  Place of Service: [facility] CARDIOLOGY  Patient Name: Rosanne Adair  :1955    Chief Complaint   Patient presents with   • Irregular Heart Beat   • Dizziness   :     HPI:     Ms. Adair is 65 y.o. and presents today for a second opinion.  She is a very pleasant woman and we enjoyed our visit today.    She has a history of DVT and PE in the past when she was on estrogen therapy.  She does not require chronic anticoagulation.  She has been on furosemide for leg swelling for a long while.    She had a coronary artery calcium score a few years ago; her score was 43 Agatston units (all in the RCA).    A few months ago, she reported episodes of lightheadedness.  It's not dizziness.  It always occurs with positional changes or if she has been on her feet for a while.  It goes away after a few seconds. The only time it lasted a while was when she stood up and then started doing strenuous work with her arms.  Her furosemide dose was cut in half, but this didn't help.   She was referred to cardiology.  She had a PET stress which was normal, and an echo which was unremarkable.  She wore a 48 hour Holter; it showed a 2.5% burden of premature ectopics.  The lowest heart rate was 39 and occurred during sleep.  There wer rare bursts of PACs up to 6 beats, but no SVT.  She then wore a 30 day event monitor.  It also showed a 3% burden of benign ectopics.  No sustained arrhythmia was seen.  Some of the submissions for lightheadedness corresponded to a heart rate in the high 40s and 50s, but several correspond to normal heart rates.  She saw an electrophysiologist who recommended a pacemaker but she wonders if this is the first right step.     She denies chest pain, dyspnea, or syncope.  She has rare palpitations.     Past Medical History:   Diagnosis Date   • Asthma    • DDD (degenerative disc disease), lumbar    •  Depression    • History of DVT (deep vein thrombosis)    • History of methicillin resistant staphylococcus aureus (MRSA) 2008    FINGER WOUND-TREATED [facility]   • Hyperlipidemia    • Hypertension    • Pulmonary emboli (CMS/HCC)    • Sleep apnea     CPAP   • Vomiting     RELATED TO LAP BAND       Past Surgical History:   Procedure Laterality Date   • COLONOSCOPY     • ENDOSCOPY     • ENDOSCOPY N/A 2019    Procedure: ESOPHAGOGASTRODUODENOSCOPY WITH BIOPSY;  Surgeon: Wilbert Szymanski Jr., MD;  Location: [facility] ENDOSCOPY;  Service: General   • FINGER SURGERY Right     INDEX FINGER-MRSA WOUND TREATED [facility], [place]   • FOOT SURGERY Right    • GASTRIC BANDING REMOVAL N/A 2020    Procedure: GASTRIC BANDING REMOVAL LAPAROSCOPIC, PORT REMOVAL, AND LYSIS OF ADHESIONS;  Surgeon: Wilbert Szymanski Jr., MD;  Location: [facility] OR [facility];  Service: General;  Laterality: N/A;   • KNEE ARTHROPLASTY Right    • LAPAROSCOPIC CHOLECYSTECTOMY     • LAPAROSCOPIC GASTRIC BANDING     • TONSILLECTOMY         Social History     Socioeconomic History   • Marital status:      Spouse name: Not on file   • Number of children: 1   • Years of education: Not on file   • Highest education level: Not on file   Occupational History   • Occupation: administrative    Tobacco Use   • Smoking status: Former Smoker     Quit date: 1996     Years since quittin.0   • Smokeless tobacco: Never Used   Substance and Sexual Activity   • Alcohol use: Never     Frequency: Never   • Drug use: Never   • Sexual activity: Defer   Social History Narrative    Caffeine intake: 2 cups daily.        Family History   Problem Relation Age of Onset   • Coronary artery disease Father         CABG twice, diagnosed early 60s   • Heart disease Brother         had a PPM implanted, symptoms started mid 60s   • Malig Hyperthermia Neg Hx      Review of Systems   Cardiovascular: Positive for  "palpitations.   Neurological: Positive for light-headedness.   All other systems reviewed and are negative.      Allergies   Allergen Reactions   • Penicillins Anaphylaxis   • Adhesive Tape Rash         Current Outpatient Medications:   •  albuterol sulfate  (90 Base) MCG/ACT inhaler, Inhale 2 puffs Every 4 (Four) Hours As Needed., Disp: , Rfl:   •  aspirin 81 MG chewable tablet, Chew 81 mg Daily. HOLD PRIOR TO SURGERY 02-, Disp: , Rfl:   •  budesonide-formoterol (SYMBICORT) 80-4.5 MCG/ACT inhaler, Inhale 2 puffs 2 (Two) Times a Day., Disp: , Rfl:   •  Cholecalciferol (D 1000) 25 MCG (1000 UT) capsule, Take 2,000 Units by mouth Daily. HOLD PRIOR TO SURGERY 02-, Disp: , Rfl:   •  cromolyn (OPTICROM) 4 % ophthalmic solution, Administer 1 drop to both eyes As Needed., Disp: , Rfl:   •  furosemide (LASIX) 40 MG tablet, Take 20 mg by mouth Daily., Disp: , Rfl:   •  levocetirizine (XYZAL) 5 MG tablet, Take 5 mg by mouth Daily., Disp: , Rfl:   •  losartan (COZAAR) 100 MG tablet, Take 100 mg by mouth Daily., Disp: , Rfl:   •  meloxicam (MOBIC) 7.5 MG tablet, Take 7.5 mg by mouth Daily. HOLD PRIOR TO SURGERY 02-, Disp: , Rfl:   •  mupirocin (BACTROBAN) 2 % ointment, As Needed., Disp: , Rfl:   •  Olopatadine HCl (PAZEO) 0.7 % solution, As Needed., Disp: , Rfl:   •  pravastatin (PRAVACHOL) 20 MG tablet, Take 20 mg by mouth Every Night., Disp: , Rfl:   •  sertraline (ZOLOFT) 100 MG tablet, Take 150 mg by mouth Daily., Disp: , Rfl:       Objective:     Vitals:    01/08/21 1129   BP: 130/84   BP Location: Left arm   Pulse: 62   Weight: 122 kg (269 lb 6.4 oz)   Height: 177.8 cm (70\")     Vitals:    01/08/21 1218 01/08/21 1219 01/08/21 1221   Orthostatic BP: 122/84 126/78 118/78   Orthostatic Pulse: 59 64 68     Body mass index is 38.65 kg/m².    Vitals signs reviewed.   Constitutional:       Appearance: Overweight.   Eyes:      Conjunctiva/sclera: Conjunctivae normal.   HENT:      Head: Normocephalic. "      Nose: Nose normal.         Comments: Masked  Neck:      Musculoskeletal: Normal range of motion.      Vascular: No JVD. JVD normal.      Lymphadenopathy: No cervical adenopathy.   Pulmonary:      Effort: Pulmonary effort is normal.      Breath sounds: Normal breath sounds.   Cardiovascular:      Normal rate. Regular rhythm.      Murmurs: There is no murmur.   Pulses:     Intact distal pulses.   Abdominal:      Palpations: Abdomen is soft.      Tenderness: There is no abdominal tenderness.   Musculoskeletal: Normal range of motion.   Skin:     General: Skin is warm and dry.      Findings: No rash.   Neurological:      Mental Status: Alert, oriented to person, place, and time and oriented to person, place and time.      Cranial Nerves: No cranial nerve deficit.   Psychiatric:         Behavior: Behavior normal.         Thought Content: Thought content normal.         Judgment: Judgment normal.           ECG 12 Lead    Date/Time: 1/8/2021 3:28 PM  Performed by: Johnathan Mckay MD  Authorized by: Johnathan Mckay MD   Comparison: compared with previous ECG   Similar to previous ECG  Rhythm: sinus rhythm  Conduction: conduction normal  ST Segments: ST segments normal  T Waves: T waves normal  QRS axis: normal  Other: no other findings    Clinical impression: normal ECG              Assessment:       Diagnosis Plan   1. Episodic lightheadedness     2. Bradycardia, sinus     3. Ectopic beats     4. Chronic venous insufficiency     5. Essential hypertension     6. Agatston CAC score, <100            Plan:       Ms Adair is describing positional lightheadedness that is usually transient.  The one episode that lasted longer occurred when she stood and then started using her arms for strenuous activity.  She has a history of DVT and I highly suspect she has venous insufficiency and venous pooling and that this is causing effectively reduced cardiac output when she goes from sitting to standing.  Also, she is obese, and  spends a lot of time seated for work.    I agree with the lower dose of furosemide.  I have recommended she be fitted for compression stockings.  I do not feel that her symptoms sound like they are consistent with conduction system disease, nor do they consistently correspond to any rhythm abnormality. I would not expect a heart rate in the high 40s to cause symptoms in a 66yo woman.  I do not feel she needs a pacemaker.    She has a low burden (2-3%) of benign PVCs and PACs.  She has not had any SVT (the max was 6 beats).  She has rare benign palpitations that are not bothersome to her. I do not feel she needs an AVN blocker or an antiarrhythmic.    Her blood pressure is within goal.    Her CACS was very mildly elevated (43) in 2018.  She's on low dose aspirin and pravastatin.  She has no anginal symptoms and she had a normal PET stress in October 2020.     I'll see her back in three months.     Sincerely,       Johnathan Mckay MD

## 2021-02-08 ENCOUNTER — TELEPHONE (OUTPATIENT)
Dept: CARDIOLOGY | Facility: CLINIC | Age: 66
End: 2021-02-08

## 2021-02-08 NOTE — TELEPHONE ENCOUNTER
Leta, please let her know to resume it at every other day dosing.  She should let Dr Gonzalez know the plan (he's not in Epic).     MARIA ELENA MADSEN

## 2021-02-08 NOTE — TELEPHONE ENCOUNTER
I called pt to discuss, she denies any soa, but does still have lightheadedness which is unchanged off the Lasix (advised to stop by pcp)  Please advise, pt has also reached out to her pcp as well for instruction, but was uncertain if she should get any instruction from cardiology as well.      Please see re below.

## 2021-02-08 NOTE — TELEPHONE ENCOUNTER
----- Message from Rosanne Adair sent at 2/8/2021 12:10 PM EST -----  Regarding: Prescription Question  Contact: [phone]  Hi,  My doc Dr. Gonzalez had me stop my lasix 1.28.21 for 2 weeks and see how I do. I go for blood work on the 12th to check kidney function and some other things. I attached my email to him to this note. My skin on my hands and calves is so tight in the morning it hurts. I put on 5.9 lbs last week. I didnt stray from my WW plan That Much!!! I dont know if I should go back on lasix or not or every other day but I guess I just have to be on a diuretic.  Please advise,  Rosanne

## 2021-03-22 ENCOUNTER — BULK ORDERING (OUTPATIENT)
Dept: CASE MANAGEMENT | Facility: OTHER | Age: 66
End: 2021-03-22

## 2021-03-22 DIAGNOSIS — Z23 IMMUNIZATION DUE: ICD-10-CM

## 2021-04-03 ENCOUNTER — PATIENT MESSAGE (OUTPATIENT)
Dept: CARDIOLOGY | Facility: CLINIC | Age: 66
End: 2021-04-03

## 2021-04-03 DIAGNOSIS — E66.01 SEVERE OBESITY (BMI 35.0-39.9) WITH COMORBIDITY (HCC): Primary | ICD-10-CM

## 2021-04-05 NOTE — TELEPHONE ENCOUNTER
From: Rosanne Adair  To: Johnathan Mckay MD  Sent: 4/3/2021 6:32 PM EDT  Subject: Non-Urgent Medical Question    Hi, does your office have a dietitian or nutritionist I can make an appointment with? I have switched from doing WW to Lose It jessica. The jessica gives a bunch of info but I dont know what to take from it, except I need more protein and less fat. I need someone qualified to help me make better choices in my food.  Thanks,  Rosanne

## 2021-04-21 ENCOUNTER — OFFICE VISIT (OUTPATIENT)
Dept: CARDIOLOGY | Facility: CLINIC | Age: 66
End: 2021-04-21

## 2021-04-21 VITALS
DIASTOLIC BLOOD PRESSURE: 70 MMHG | HEIGHT: 70 IN | SYSTOLIC BLOOD PRESSURE: 124 MMHG | HEART RATE: 63 BPM | BODY MASS INDEX: 39.51 KG/M2 | WEIGHT: 276 LBS

## 2021-04-21 DIAGNOSIS — I10 ESSENTIAL HYPERTENSION: Primary | ICD-10-CM

## 2021-04-21 DIAGNOSIS — R42 POSITIONAL LIGHTHEADEDNESS: ICD-10-CM

## 2021-04-21 DIAGNOSIS — R93.1 AGATSTON CAC SCORE, <100: ICD-10-CM

## 2021-04-21 DIAGNOSIS — I49.49 ECTOPIC BEATS: ICD-10-CM

## 2021-04-21 PROBLEM — E66.01 OBESITY, CLASS III, BMI 40-49.9 (MORBID OBESITY) (HCC): Status: RESOLVED | Noted: 2019-11-22 | Resolved: 2021-04-21

## 2021-04-21 PROCEDURE — 93000 ELECTROCARDIOGRAM COMPLETE: CPT | Performed by: INTERNAL MEDICINE

## 2021-04-21 PROCEDURE — 99213 OFFICE O/P EST LOW 20 MIN: CPT | Performed by: INTERNAL MEDICINE

## 2021-04-21 RX ORDER — FOLIC ACID 1 MG/1
1 TABLET ORAL DAILY
COMMUNITY

## 2021-04-21 NOTE — PROGRESS NOTES
Date of Office Visit: 2021  Encounter Provider: Johnathan Mckay MD  Place of Service: [facility] CARDIOLOGY  Patient Name: Rosanne Adair  :1955    Chief Complaint   Patient presents with   • Dizziness   :     HPI:     Ms. Adair is 65 y.o. and presents today to follow up. I have reviewed prior notes and there are no changes except for any new updates described below. I have also reviewed any information entered into the medical record by the patient or by ancillary staff.     She has a history of DVT and PE in the past when she was on estrogen therapy.  She does not require chronic anticoagulation.      She had a coronary artery calcium score in 2018; her score was 43 Agatston units (all in the RCA).    In late , she reported episodes of lightheadedness that would occur when she would change positions.  Her furosemide dose was cut in half, but this didn't help. She was referred to cardiology.  She had a PET stress which was normal, and an echo which was unremarkable.  She wore a 48 hour Holter; it showed a 2.5% burden of premature ectopics.  The lowest heart rate was 39 and occurred during sleep.  There wer rare bursts of PACs up to 6 beats, but no SVT.  She then wore a 30 day event monitor.  It also showed a 3% burden of benign ectopics.  No sustained arrhythmia was seen.  Some of the submissions for lightheadedness corresponded to a heart rate in the high 40s and 50s, but several correspond to normal heart rates.  She saw an electrophysiologist who recommended a pacemaker, but she sought a second opinion as she wondered if this was the first right step.     I felt that her positional lightheadedness was actually due to venous pooling and mild orthostasis.  I had a high suspicion that she had venous insufficiency given her weight and previous DVT.  I recommended a lower dose of diuretics, compression stockings, and leg pumps and leg cycling while seated at a desk for  long periods of time.  I did not feel that her symptoms sounded like they were consistent with conduction system disease and I did not feel that she needed a pacemaker.  We did note that she had benign-appearing ectopics, and would continue to have intermittent symptoms, but that we knew they were not going to cause her any harm, and very low burden.    Since her last visit, she is been doing well, her positional lightheadedness is improved with lifestyle changes.  She does wear compression stockings intermittently.  She still has occasional palpitations when she lies down at night but they are tolerable.  She has no other cardiac symptoms.    Past Medical History:   Diagnosis Date   • Asthma    • DDD (degenerative disc disease), lumbar    • Depression    • Ectopic beats     PACs and PVCs   • History of DVT (deep vein thrombosis)    • History of methicillin resistant staphylococcus aureus (MRSA) 2008    FINGER WOUND-TREATED [facility]   • Hyperlipidemia    • Hypertension    • Pulmonary emboli (CMS/HCC)    • Sleep apnea     CPAP   • Vomiting     RELATED TO LAP BAND       Past Surgical History:   Procedure Laterality Date   • COLONOSCOPY     • ENDOSCOPY     • ENDOSCOPY N/A 12/24/2019    Procedure: ESOPHAGOGASTRODUODENOSCOPY WITH BIOPSY;  Surgeon: Wilbert Szymanski Jr., MD;  Location: [facility] ENDOSCOPY;  Service: General   • FINGER SURGERY Right     INDEX FINGER-MRSA WOUND TREATED [place], [place]   • FOOT SURGERY Right    • GASTRIC BANDING REMOVAL N/A 2/12/2020    Procedure: GASTRIC BANDING REMOVAL LAPAROSCOPIC, PORT REMOVAL, AND LYSIS OF ADHESIONS;  Surgeon: Wilbert Szymanski Jr., MD;  [place] [facility] [place] [facility] [place]  Service: General;  Laterality: N/A;   • KNEE ARTHROPLASTY Right    • LAPAROSCOPIC CHOLECYSTECTOMY     • LAPAROSCOPIC GASTRIC BANDING     • TONSILLECTOMY         Social History     Socioeconomic History   • Marital status:      Spouse name: Not  on file   • Number of children: 1   • Years of education: Not on file   • Highest education level: Not on file   Tobacco Use   • Smoking status: Former Smoker     Quit date: 1996     Years since quittin.3   • Smokeless tobacco: Never Used   Vaping Use   • Vaping Use: Never used   Substance and Sexual Activity   • Alcohol use: Never   • Drug use: Never   • Sexual activity: Defer       Family History   Problem Relation Age of Onset   • Coronary artery disease Father         CABG twice, diagnosed early 60s   • Heart disease Brother         had a PPM implanted, symptoms started mid 60s   • Malig Hyperthermia Neg Hx      Review of Systems   Constitutional: Positive for weight gain.   Cardiovascular: Positive for palpitations.   Neurological: Positive for light-headedness.   All other systems reviewed and are negative.      Allergies   Allergen Reactions   • Penicillins Anaphylaxis   • Adhesive Tape Rash         Current Outpatient Medications:   •  albuterol sulfate  (90 Base) MCG/ACT inhaler, Inhale 2 puffs Every 4 (Four) Hours As Needed., Disp: , Rfl:   •  aspirin 81 MG chewable tablet, Chew 81 mg Daily. HOLD PRIOR TO SURGERY 2020, Disp: , Rfl:   •  budesonide-formoterol (SYMBICORT) 80-4.5 MCG/ACT inhaler, Inhale 2 puffs 2 (Two) Times a Day., Disp: , Rfl:   •  Cholecalciferol (D 1000) 25 MCG (1000 UT) capsule, Take 2,000 Units by mouth Daily. HOLD PRIOR TO SURGERY 2020, Disp: , Rfl:   •  cromolyn (OPTICROM) 4 % ophthalmic solution, Administer 1 drop to both eyes As Needed., Disp: , Rfl:   •  Cyanocobalamin (VITAMIN B 12 PO), Take  by mouth Daily., Disp: , Rfl:   •  folic acid (FOLVITE) 1 MG tablet, Take 1 mg by mouth Daily., Disp: , Rfl:   •  furosemide (LASIX) 40 MG tablet, Take 20 mg by mouth Every Other Day., Disp: , Rfl:   •  levocetirizine (XYZAL) 5 MG tablet, Take 5 mg by mouth Daily., Disp: , Rfl:   •  losartan (COZAAR) 100 MG tablet, Take 100 mg by mouth Daily., Disp: , Rfl:   •   "meloxicam (MOBIC) 7.5 MG tablet, Take 7.5 mg by mouth Daily. HOLD PRIOR TO SURGERY 02-, Disp: , Rfl:   •  mupirocin (BACTROBAN) 2 % ointment, As Needed., Disp: , Rfl:   •  Olopatadine HCl (PAZEO) 0.7 % solution, As Needed., Disp: , Rfl:   •  pravastatin (PRAVACHOL) 20 MG tablet, Take 20 mg by mouth Every Night., Disp: , Rfl:   •  sertraline (ZOLOFT) 100 MG tablet, Take 150 mg by mouth Daily., Disp: , Rfl:       Objective:     Vitals:    04/21/21 1120   BP: 124/70   BP Location: Left arm   Pulse: 63   Weight: 125 kg (276 lb)   Height: 177.8 cm (70\")     There were no vitals filed for this visit.  Body mass index is 39.6 kg/m².    Vitals reviewed.   Constitutional:       Appearance: Overweight and not in distress.   Eyes:      Conjunctiva/sclera: Conjunctivae normal.   HENT:      Head: Normocephalic.      Nose: Nose normal.         Comments: Masked  Neck:      Vascular: No JVD. JVD normal.      Lymphadenopathy: No cervical adenopathy.   Pulmonary:      Effort: Pulmonary effort is normal.      Breath sounds: Normal breath sounds.   Cardiovascular:      Normal rate. Regular rhythm.      Murmurs: There is no murmur.   Pulses:     Intact distal pulses.   Edema:     Peripheral edema absent.   Abdominal:      Palpations: Abdomen is soft.      Tenderness: There is no abdominal tenderness.   Musculoskeletal: Normal range of motion.      Cervical back: Normal range of motion. Skin:     General: Skin is warm and dry.      Findings: No rash.   Neurological:      General: No focal deficit present.      Mental Status: Alert, oriented to person, place, and time and oriented to person, place and time.      Cranial Nerves: No cranial nerve deficit.   Psychiatric:         Behavior: Behavior normal.         Thought Content: Thought content normal.         Judgment: Judgment normal.           ECG 12 Lead    Date/Time: 4/21/2021 11:33 AM  Performed by: Johnathan Mckay MD  Authorized by: Johnathan Mckay MD   Comparison: compared with " previous ECG   Similar to previous ECG  Rhythm: sinus rhythm  Conduction: conduction normal  ST Segments: ST segments normal  T Waves: T waves normal  QRS axis: normal  Other: no other findings    Clinical impression: normal ECG              Assessment:       Diagnosis Plan   1. Essential hypertension  ECG 12 Lead   2. Positional lightheadedness  ECG 12 Lead   3. Ectopic beats  ECG 12 Lead   4. Agatston CAC score, <100            Plan:       Ms Adair has essential hypertension with an element of orthostatic hypotension, likely due to venous insufficiency.  She does require a low-dose diuretic to prevent peripheral edema, but higher doses will worsen this.  She will continue with lifestyle changes, including leg pumps, leg cycling, compression hose, and slow position changes.  Currently her symptoms are manageable.    She has benign ectopics with a low burden, less than 3%.  These mostly bother her at night when she is lying down.  She is reassured that they would not cause her any harm.  I do not feel that she needs an AV laisha blocker.    Her CACS was very mildly elevated (43) in 2018.  She's on low dose aspirin and pravastatin.  She has no anginal symptoms and she had a normal PET stress in October 2020.     Sincerely,       Johnathan Mckay MD

## 2021-04-29 ENCOUNTER — HOSPITAL ENCOUNTER (OUTPATIENT)
Dept: DIABETES SERVICES | Facility: HOSPITAL | Age: 66
Discharge: HOME | End: 2021-04-29
Admitting: INTERNAL MEDICINE

## 2021-04-29 PROCEDURE — 97802 MEDICAL NUTRITION INDIV IN: CPT | Performed by: DIETITIAN, REGISTERED

## 2021-09-02 ENCOUNTER — TELEPHONE (OUTPATIENT)
Dept: CARDIOLOGY | Facility: CLINIC | Age: 66
End: 2021-09-02

## 2021-09-02 NOTE — TELEPHONE ENCOUNTER
The patient called stating that she was having CP, radiating down her right arm, shoulder, and neck, accompanied by SOA.  She wanted to let us know that she was pulling into the ED at [facility] as she called.      Mary Hernández, APRN

## 2021-09-08 NOTE — PROGRESS NOTES
RM:________     PCP: Roscoe Gonzalez MD    : 1955  AGE: 66 y.o.  EST PATIENT   REASON FOR VISIT/  CC:    BP Readings from Last 3 Encounters:   21 124/70   21 130/84   20 140/81        WT: ____________ BP: __________L __________R HR______    CHEST PAIN: _____________    SOA: _____________PALPS: _______________     LIGHTHEADED: ___________FATIGUE: ________________ EDEMA __________    ALLERGIES:Penicillins and Adhesive tape SMOKING HISTORY:  Social History     Tobacco Use   • Smoking status: Former Smoker     Quit date: 1996     Years since quittin.6   • Smokeless tobacco: Never Used   Vaping Use   • Vaping Use: Never used   Substance Use Topics   • Alcohol use: Never   • Drug use: Never     CAFFEINE USE_________________  ALCOHOL ______________________    Below is the patient's most recent value for Albumin, ALT, AST, BUN, Calcium, Chloride, Cholesterol, CO2, Creatinine, GFR, Glucose, HDL, Hematocrit, Hemoglobin, Hemoglobin A1C, LDL, Magnesium, Phosphorus, Platelets, Potassium, PSA, Sodium, Triglycerides, TSH and WBC.   Lab Results   Component Value Date    ALBUMIN 4.4 2020    ALT 10 (L) 2020    AST 15 2020    BUN 20 2020    CALCIUM 9.7 2020     2020    CO2 27 2020    CREATININE 1.0 2020    GLU 94 2020    HDL 98 2020    HCT 34.5 (L) 2021    HGB 11.7 (L) 2021    HGBA1C 5.0 2021    LDL 63 2020    MG 2.2 2021     2021    K 4.3 2020     2020    TRIG 107 2020    TSH 3.410 2021    WBC 5.51 2021          NEW DIAGNOSIS/ SURGERY/ HOSP OR ED VISITS: ______________________    __________________________________________________________________      RECENT LABS OR DIAGNOSTIC TESTING:  _____________________________    __________________________________________________________________      ASSESSMENT/ PLAN:  _______________________________________________    __________________________________________________________________

## 2021-09-13 ENCOUNTER — OFFICE VISIT (OUTPATIENT)
Dept: CARDIOLOGY | Facility: CLINIC | Age: 66
End: 2021-09-13

## 2021-09-13 VITALS
HEART RATE: 63 BPM | WEIGHT: 291.2 LBS | SYSTOLIC BLOOD PRESSURE: 120 MMHG | BODY MASS INDEX: 41.69 KG/M2 | DIASTOLIC BLOOD PRESSURE: 70 MMHG | HEIGHT: 70 IN

## 2021-09-13 DIAGNOSIS — I49.49 ECTOPIC BEATS: ICD-10-CM

## 2021-09-13 DIAGNOSIS — R93.1 AGATSTON CAC SCORE, <100: ICD-10-CM

## 2021-09-13 DIAGNOSIS — R07.89 CHEST PAIN, ATYPICAL: Primary | ICD-10-CM

## 2021-09-13 PROBLEM — R42 POSITIONAL LIGHTHEADEDNESS: Status: RESOLVED | Noted: 2021-04-21 | Resolved: 2021-09-13

## 2021-09-13 PROCEDURE — 93000 ELECTROCARDIOGRAM COMPLETE: CPT | Performed by: INTERNAL MEDICINE

## 2021-09-13 PROCEDURE — 99214 OFFICE O/P EST MOD 30 MIN: CPT | Performed by: INTERNAL MEDICINE

## 2021-09-13 NOTE — PROGRESS NOTES
Date of Office Visit: 2021  Encounter Provider: Johnathan Mckay MD  Place of Service: [facility] CARDIOLOGY  Patient Name: Rosanne Adair  :1955    Chief Complaint   Patient presents with   • Chest Pain   :   HPI:     Ms. Adair is 66 y.o. and presents today to follow up. I have reviewed prior notes and there are no changes except for any new updates described below. I have also reviewed any information entered into the medical record by the patient or by ancillary staff.     She has a history of DVT and PE in the past when she was on estrogen therapy.  She does not require chronic anticoagulation.      She had a coronary artery calcium score in 2018; her score was 43 Agatston units (all in the RCA).    In late , she reported episodes of lightheadedness that would occur when she would change positions.  Her furosemide dose was cut in half, but this didn't help. She was referred to cardiology.  She had a PET stress which was normal, and an echo which was unremarkable.  She wore a 48 hour Holter; it showed a 2.5% burden of premature ectopics.  The lowest heart rate was 39 and occurred during sleep.  There wer rare bursts of PACs up to 6 beats, but no SVT.  She then wore a 30 day event monitor.  It also showed a 3% burden of benign ectopics.  No sustained arrhythmia was seen.  Some of the submissions for lightheadedness corresponded to a heart rate in the high 40s and 50s, but several correspond to normal heart rates.  She saw an electrophysiologist who recommended a pacemaker, but she sought a second opinion as she wondered if this was the first right step.     I felt that her positional lightheadedness was actually due to venous pooling and mild orthostasis.  I had a high suspicion that she had venous insufficiency given her weight and previous DVT.  I recommended a lower dose of diuretics, compression stockings, and leg pumps and leg cycling while seated at a desk for  long periods of time.  I did not feel that her symptoms sounded like they were consistent with conduction system disease and I did not feel that she needed a pacemaker.  We did note that she had benign-appearing ectopics, and would continue to have intermittent symptoms, but that we knew they were not going to cause her any harm, and very low burden.    She presented to [facility] on September 2 with chest pain. She woke up with lower sternal chest discomfort that lasted all day. Nothing made it better or worse. She went to [facility] and ruled out for ACS. A perfusion stress test was performed and was normal, LVEF >70%. Since then, she has not had any chest pain or dyspnea. Her palpitations are mild and stable.     Past Medical History:   Diagnosis Date   • Asthma    • DDD (degenerative disc disease), lumbar    • Depression    • Ectopic beats     PACs and PVCs   • History of DVT (deep vein thrombosis)    • History of methicillin resistant staphylococcus aureus (MRSA) 2008    FINGER WOUND-TREATED [facility]   • Hyperlipidemia    • Hypertension    • Pulmonary emboli (CMS/HCC)    • Sleep apnea     CPAP   • Vomiting     RELATED TO LAP BAND       Past Surgical History:   Procedure Laterality Date   • COLONOSCOPY     • ENDOSCOPY     • ENDOSCOPY N/A 12/24/2019    Procedure: ESOPHAGOGASTRODUODENOSCOPY WITH BIOPSY;  Surgeon: Wilbert Szymanski Jr., MD;  Location: [facility] ENDOSCOPY;  Service: General   • FINGER SURGERY Right     INDEX FINGER-MRSA WOUND TREATED [facility], [place]   • FOOT SURGERY Right    • GASTRIC BANDING REMOVAL N/A 2/12/2020    Procedure: GASTRIC BANDING REMOVAL LAPAROSCOPIC, PORT REMOVAL, AND LYSIS OF ADHESIONS;  Surgeon: Wilbert Szymanski Jr., MD;  [place] [facility] [place] [facility] [place]  Service: General;  Laterality: N/A;   • KNEE ARTHROPLASTY Right    • LAPAROSCOPIC CHOLECYSTECTOMY     • LAPAROSCOPIC GASTRIC BANDING     • TONSILLECTOMY         Social History      Socioeconomic History   • Marital status:      Spouse name: Not on file   • Number of children: 1   • Years of education: Not on file   • Highest education level: Not on file   Tobacco Use   • Smoking status: Former Smoker     Quit date: 1996     Years since quittin.6   • Smokeless tobacco: Never Used   Vaping Use   • Vaping Use: Never used   Substance and Sexual Activity   • Alcohol use: Not Currently     Comment: Caffeine use: 1 cup daily    • Drug use: Never   • Sexual activity: Defer       Family History   Problem Relation Age of Onset   • Coronary artery disease Father         CABG twice, diagnosed early 60s   • Heart disease Brother         had a PPM implanted, symptoms started mid 60s   • Malig Hyperthermia Neg Hx      Review of Systems   Constitutional: Positive for weight gain.   Cardiovascular: Positive for palpitations.   Neurological: Positive for light-headedness.   All other systems reviewed and are negative.      Allergies   Allergen Reactions   • Penicillins Anaphylaxis   • Adhesive Tape Rash         Current Outpatient Medications:   •  albuterol sulfate  (90 Base) MCG/ACT inhaler, Inhale 2 puffs Every 4 (Four) Hours As Needed., Disp: , Rfl:   •  aspirin 81 MG chewable tablet, Chew 81 mg Daily. HOLD PRIOR TO SURGERY 2020, Disp: , Rfl:   •  budesonide-formoterol (SYMBICORT) 80-4.5 MCG/ACT inhaler, Inhale 2 puffs 2 (Two) Times a Day., Disp: , Rfl:   •  Cholecalciferol (D 1000) 25 MCG (1000 UT) capsule, Take 2,000 Units by mouth Daily. HOLD PRIOR TO SURGERY 2020, Disp: , Rfl:   •  cromolyn (OPTICROM) 4 % ophthalmic solution, Administer 1 drop to both eyes As Needed., Disp: , Rfl:   •  Cyanocobalamin (VITAMIN B 12 PO), Take  by mouth Daily., Disp: , Rfl:   •  folic acid (FOLVITE) 1 MG tablet, Take 1 mg by mouth Daily., Disp: , Rfl:   •  furosemide (LASIX) 40 MG tablet, Take 20 mg by mouth Every Other Day., Disp: , Rfl:   •  levocetirizine (XYZAL) 5 MG tablet, Take 5  "mg by mouth Daily., Disp: , Rfl:   •  losartan (COZAAR) 100 MG tablet, Take 100 mg by mouth Daily., Disp: , Rfl:   •  meloxicam (MOBIC) 7.5 MG tablet, Take 7.5 mg by mouth Daily. HOLD PRIOR TO SURGERY 02-, Disp: , Rfl:   •  mupirocin (BACTROBAN) 2 % ointment, As Needed., Disp: , Rfl:   •  Olopatadine HCl (PAZEO) 0.7 % solution, As Needed., Disp: , Rfl:   •  pravastatin (PRAVACHOL) 20 MG tablet, Take 20 mg by mouth Every Night., Disp: , Rfl:   •  sertraline (ZOLOFT) 100 MG tablet, Take 150 mg by mouth Daily., Disp: , Rfl:       Objective:     Vitals:    09/13/21 1141   BP: 120/70   BP Location: Left arm   Pulse: 63   Weight: 132 kg (291 lb 3.2 oz)   Height: 177.8 cm (70\")     There were no vitals filed for this visit.  Body mass index is 41.78 kg/m².    Vitals reviewed.   Constitutional:       Appearance: Overweight and not in distress.   Eyes:      Conjunctiva/sclera: Conjunctivae normal.   HENT:      Head: Normocephalic.      Nose: Nose normal.         Comments: Masked  Neck:      Vascular: No JVD. JVD normal.      Lymphadenopathy: No cervical adenopathy.   Pulmonary:      Effort: Pulmonary effort is normal.      Breath sounds: Normal breath sounds.   Cardiovascular:      Normal rate. Regular rhythm.      Murmurs: There is no murmur.   Pulses:     Intact distal pulses.   Edema:     Peripheral edema absent.   Abdominal:      Palpations: Abdomen is soft.      Tenderness: There is no abdominal tenderness.   Musculoskeletal: Normal range of motion.      Cervical back: Normal range of motion. Skin:     General: Skin is warm and dry.      Findings: No rash.   Neurological:      General: No focal deficit present.      Mental Status: Alert, oriented to person, place, and time and oriented to person, place and time.      Cranial Nerves: No cranial nerve deficit.   Psychiatric:         Behavior: Behavior normal.         Thought Content: Thought content normal.         Judgment: Judgment normal.           ECG 12 " Lead    Date/Time: 9/13/2021 12:22 PM  Performed by: Johnathan Mckay MD  Authorized by: Johnathan Mckay MD   Comparison: compared with previous ECG   Similar to previous ECG  Rhythm: sinus rhythm  Conduction: conduction normal  ST Segments: ST segments normal  T Waves: T waves normal  QRS axis: normal  Other findings: poor R wave progression    Clinical impression: normal ECG            Assessment:       Diagnosis Plan   1. Chest pain, atypical  ECG 12 Lead   2. Agatston CAC score, <100     3. Ectopic beats        Plan:       She had atypical chest pain that occurred at rest and lasted for hours without variation; she ruled out for ACS and had a normal perfusion stress test. Her Agatston score was 43 in 2018; she is on aspirin and pravastatin. She has no exertional cardiac symptoms.  I suspect this was esophageal in etiology; I encouraged her to take OTC famotidine 40mg daily and follow up with her PCP.     She has benign ectopics with a low burden, less than 3%.  These mostly bother her at night when she is lying down.  She is reassured that they would not cause her any harm.  I do not feel that she needs an AV laisha blocker.    Sincerely,       Johnathan Mckay MD

## 2022-01-21 ENCOUNTER — TELEPHONE (OUTPATIENT)
Dept: CARDIOLOGY | Facility: CLINIC | Age: 67
End: 2022-01-21

## 2022-01-21 NOTE — TELEPHONE ENCOUNTER
Regarding: Short of breath issues  ----- Message from Leta Barrett MA sent at 1/20/2022  5:08 PM EST -----       ----- Message from Rosanne Adair to Johnathan Mckay MD sent at 1/20/2022 11:50 AM -----   Hi Dr Mckay,  I saw my internist today. Chest xrays are clear, waiting on blood work results. If I don't get any answers, I will email you for your help.  Thank Shon salas       ----- Message -----       From:Rosanne Adair       Sent:1/17/2022  9:28 PM EST         To:Johnathan Mckay MD    Subject:Short of breath issues    Hi, I am concerned with being short of breath with little exertion. I have gained weight but I wasn't sob when I was this weight before. My sleep apnea doctor had me do an oximeter overnight test 3 weeks ago. I haven't gotten any results. I go up stairs and I am out of breath. I walk from the parking lot to my office and I am short of breath. It has been going on for at least 3 weeks and not getting better. Please advise.  Shon Adair

## 2022-01-21 NOTE — TELEPHONE ENCOUNTER
I called and s/w her.  Nothing acute, just SOA for the last two months. Explained that BNP going from 100 to 166 is not a terrifically awful jump. She is bloated but had been on steroids and was eating salt. However, she feels she's dehydrated as she doesn't drink much water.    She has a history of VTE but has no leg swelling or pain or redness, and again this is subacute.    She had a normal stress in September.    The differential is so wide -- I would like her to see TK or another APRN this upcoming week.  She is agreeable.    raven pulliam

## 2022-01-28 ENCOUNTER — OFFICE VISIT (OUTPATIENT)
Dept: CARDIOLOGY | Facility: CLINIC | Age: 67
End: 2022-01-28

## 2022-01-28 ENCOUNTER — LAB (OUTPATIENT)
Dept: LAB | Facility: HOSPITAL | Age: 67
End: 2022-01-28

## 2022-01-28 VITALS
HEART RATE: 66 BPM | WEIGHT: 293 LBS | BODY MASS INDEX: 41.95 KG/M2 | SYSTOLIC BLOOD PRESSURE: 130 MMHG | DIASTOLIC BLOOD PRESSURE: 70 MMHG | HEIGHT: 70 IN

## 2022-01-28 DIAGNOSIS — R93.1 AGATSTON CAC SCORE, <100: ICD-10-CM

## 2022-01-28 DIAGNOSIS — I49.49 ECTOPIC BEATS: ICD-10-CM

## 2022-01-28 DIAGNOSIS — R06.09 DOE (DYSPNEA ON EXERTION): Primary | ICD-10-CM

## 2022-01-28 LAB
ANION GAP SERPL CALCULATED.3IONS-SCNC: 11 MMOL/L (ref 5–15)
BUN SERPL-MCNC: 22 MG/DL (ref 8–23)
BUN/CREAT SERPL: 18.6 (ref 7–25)
CALCIUM SPEC-SCNC: 9.6 MG/DL (ref 8.6–10.5)
CHLORIDE SERPL-SCNC: 104 MMOL/L (ref 98–107)
CO2 SERPL-SCNC: 25 MMOL/L (ref 22–29)
CREAT SERPL-MCNC: 1.18 MG/DL (ref 0.57–1)
GFR SERPL CREATININE-BSD FRML MDRD: 46 ML/MIN/1.73
GLUCOSE SERPL-MCNC: 108 MG/DL (ref 65–99)
NT-PROBNP SERPL-MCNC: 122 PG/ML (ref 0–900)
POTASSIUM SERPL-SCNC: 4.5 MMOL/L (ref 3.5–5.2)
SODIUM SERPL-SCNC: 140 MMOL/L (ref 136–145)

## 2022-01-28 PROCEDURE — 99214 OFFICE O/P EST MOD 30 MIN: CPT | Performed by: NURSE PRACTITIONER

## 2022-01-28 PROCEDURE — 83880 ASSAY OF NATRIURETIC PEPTIDE: CPT | Performed by: NURSE PRACTITIONER

## 2022-01-28 PROCEDURE — 93000 ELECTROCARDIOGRAM COMPLETE: CPT | Performed by: NURSE PRACTITIONER

## 2022-01-28 PROCEDURE — 80048 BASIC METABOLIC PNL TOTAL CA: CPT | Performed by: NURSE PRACTITIONER

## 2022-01-28 PROCEDURE — 36415 COLL VENOUS BLD VENIPUNCTURE: CPT | Performed by: NURSE PRACTITIONER

## 2022-01-28 NOTE — PROGRESS NOTES
Date of Office Visit: 2022  Encounter Provider: Katie Coronado, HECTOR, APRN  Place of Service: [facility] CARDIOLOGY  Patient Name: Rosanne Adair  :1955        Subjective:     Chief Complaint:  Shortness of breath with exertion, diastolic dysfunction, history of elevated proBNP      History of Present Illness:  Rosanne Adair is a 66 y.o. female patient of Dr. Mckay.  I am seeing this patient in the office today for the first time and I reviewed her records.    Patient has a history of DVT and PE while on estrogen therapy, coronary artery calcification, ectopic beats.    Patient had a CT coronary calcium score in 2018 showing a score 43 agatston units (all in RCA).  In late  she had some lightheadedness when changing positions.  She had a PET stress test that was normal and echo which was unremarkable.  48-hour Holter showed 2.5% burden of premature ectopies.  Rare bursts of PACs up to six beats was seen but not felt to be SVT.  She then wore a 30-day event monitor that showed 3% burden of benign ectopies.  No sustained arrhythmias were noted.  Some reports of lightheadedness correlated with heart rates in the high 40s and 50s but several also correlated with normal heart rates.  She saw an EP provider who recommended a pacemaker but she sought out a second opinion.  It was felt that her positional lightheadedness was actually more likely to be related to venous pooling and mild orthostasis.  Compression socks and low-dose diuretics were recommended.  She was seen at [facility] 2021 with chest discomfort.  She had a stress test showing some GI artifact at rest but otherwise normal tracer uptake in all segments during stress and rest images.  No evidence of ischemia was noted and no significant ST-T wave changes were noted.      Patient presents to office today for follow-up appointment.  Patient reports that for the last couple of months she has  noticed increased shortness of breath with exertion, not improved with her inhaler use.  She reports that she was doing water aerobics over the summer and doing well with this but not exercising several months prior to this shortness of breath starting.  She also reports she had not been following her weight watchers diet as consistently and has gained weight since last visit, looks like she is about 13 pounds since last office visit.  She has not been eating as healthy during the holiday season.  She was having some ankle and hand swelling and PCP increased her Lasix to 20 mg daily and she has noticed improvement in the swelling.  Has not noticed a difference in her breathing.  She continues to get an occasional nonsustained palpitation, unchanged.  She denies any chest pain or discomfort, dizziness, syncope, near syncope, falls, or abnormal bleeding.  She is using CPAP nightly.  She has not been monitoring blood pressure at home recently.           Past Medical History:   Diagnosis Date   • Asthma    • DDD (degenerative disc disease), lumbar    • Depression    • Ectopic beats     PACs and PVCs   • History of DVT (deep vein thrombosis)    • History of methicillin resistant staphylococcus aureus (MRSA) 2008    FINGER WOUND-TREATED [facility]   • Hyperlipidemia    • Hypertension    • Pulmonary emboli (HCC)    • Sleep apnea     CPAP   • Vomiting     RELATED TO LAP BAND     Past Surgical History:   Procedure Laterality Date   • COLONOSCOPY     • ENDOSCOPY     • ENDOSCOPY N/A 12/24/2019    Procedure: ESOPHAGOGASTRODUODENOSCOPY WITH BIOPSY;  Surgeon: Wilbert Szymanski Jr., MD;  Location: [facility] ENDOSCOPY;  Service: General   • FINGER SURGERY Right     INDEX FINGER-MRSA WOUND TREATED [place], [place]   • FOOT SURGERY Right    • GASTRIC BANDING REMOVAL N/A 2/12/2020    Procedure: GASTRIC BANDING REMOVAL LAPAROSCOPIC, PORT REMOVAL, AND LYSIS OF ADHESIONS;  Surgeon:  Wilbert Szymanski Jr., MD;  Location: [facility] OR [facility];  Service: General;  Laterality: N/A;   • KNEE ARTHROPLASTY Right    • LAPAROSCOPIC CHOLECYSTECTOMY     • LAPAROSCOPIC GASTRIC BANDING     • TONSILLECTOMY       Outpatient Medications Prior to Visit   Medication Sig Dispense Refill   • albuterol sulfate  (90 Base) MCG/ACT inhaler Inhale 2 puffs Every 4 (Four) Hours As Needed.     • aspirin 81 MG chewable tablet Chew 81 mg Daily. HOLD PRIOR TO SURGERY 2020     • budesonide-formoterol (SYMBICORT) 80-4.5 MCG/ACT inhaler Inhale 2 puffs 2 (Two) Times a Day.     • Cholecalciferol (D 1000) 25 MCG (1000 UT) capsule Take 2,000 Units by mouth Daily. HOLD PRIOR TO SURGERY 2020     • cromolyn (OPTICROM) 4 % ophthalmic solution Administer 1 drop to both eyes As Needed.     • Cyanocobalamin (VITAMIN B 12 PO) Take  by mouth Daily.     • folic acid (FOLVITE) 1 MG tablet Take 1 mg by mouth Daily.     • furosemide (LASIX) 40 MG tablet Take 20 mg by mouth Every Other Day.     • levocetirizine (XYZAL) 5 MG tablet Take 5 mg by mouth Daily.     • losartan (COZAAR) 100 MG tablet Take 100 mg by mouth Daily.     • meloxicam (MOBIC) 7.5 MG tablet Take 7.5 mg by mouth Daily. HOLD PRIOR TO SURGERY 2020     • mupirocin (BACTROBAN) 2 % ointment As Needed.     • Olopatadine HCl (PAZEO) 0.7 % solution As Needed.     • pravastatin (PRAVACHOL) 20 MG tablet Take 20 mg by mouth Every Night.     • sertraline (ZOLOFT) 100 MG tablet Take 150 mg by mouth Daily.       No facility-administered medications prior to visit.       Allergies as of 2022 - Reviewed 2022   Allergen Reaction Noted   • Penicillins Anaphylaxis 2012   • Adhesive tape Rash 2019     Social History     Socioeconomic History   • Marital status:    • Number of children: 1   Tobacco Use   • Smoking status: Former Smoker     Quit date: 1996     Years since quittin.0   • Smokeless tobacco: Never Used   Vaping Use   • Vaping  "Use: Never used   Substance and Sexual Activity   • Alcohol use: Not Currently     Comment: Caffeine use: 1 cup daily    • Drug use: Never   • Sexual activity: Defer     Family History   Problem Relation Age of Onset   • Coronary artery disease Father         CABG twice, diagnosed early 60s   • Heart disease Brother         had a PPM implanted, symptoms started mid 60s   • Malig Hyperthermia Neg Hx        Review of Systems   Constitutional: Positive for malaise/fatigue.   Cardiovascular:        SEE HPI   Respiratory: Positive for shortness of breath.         JEFERSON on CPAP   Hematologic/Lymphatic: Negative for bleeding problem.   Musculoskeletal: Negative for falls.   Gastrointestinal: Negative for melena.   Genitourinary: Negative for hematuria.   Neurological: Negative for dizziness.   Psychiatric/Behavioral: Negative for altered mental status.          Objective:     Vitals:    01/28/22 0909   BP: 130/70   BP Location: Left arm   Patient Position: Sitting   Pulse: 66   Weight: (!) 138 kg (304 lb 12.8 oz)   Height: 177.8 cm (70\")     Body mass index is 43.73 kg/m².      PHYSICAL EXAM:  Constitutional:       General: Not in acute distress.     Appearance: Well-developed. Not diaphoretic.   Eyes:      Pupils: Pupils are equal, round, and reactive to light.   HENT:      Head: Normocephalic and atraumatic.   Neck:      Vascular: No carotid bruit.   Pulmonary:      Effort: Pulmonary effort is normal. No respiratory distress.      Breath sounds: Normal breath sounds. No wheezing. No rales.   Cardiovascular:      Normal rate. Regular rhythm.      Murmurs: There is no murmur.      No gallop. No click. No rub.   Edema:     Peripheral edema absent.   Abdominal:      General: Bowel sounds are normal. There is no distension.      Palpations: Abdomen is soft.   Musculoskeletal:         General: No tenderness or deformity.      Cervical back: Neck supple.      Comments: No calf swelling, redness, tenderness, or pain.  Negative " Homans' sign bilaterally. Skin:     General: Skin is warm and dry.      Findings: No erythema or rash.   Neurological:      Mental Status: Alert and oriented to person, place, and time.   Psychiatric:         Behavior: Behavior normal.         Judgment: Judgment normal.             ECG 12 Lead    Date/Time: 1/28/2022 9:23 AM  Performed by: Katie Coronado DNP, APRN  Authorized by: Katie Coronado DNP, WILMER   Comparison: compared with previous ECG from 9/13/2021  Rhythm: sinus rhythm  BPM: 66  Comments: No significant changes from previous EKG              Assessment:       Diagnosis Plan   1. CASTLE (dyspnea on exertion)     2. Ectopic beats     3. Agatston CAC score, <100           Plan:     1. Shortness of breath with exertion: Sounds like this is likely multifactorial with deconditioning, weight gain, does also have a history of diastolic dysfunction on previous echo and elevated proBNP.  PCP increased Lasix to 20 mg daily last week, per patient.  We will recheck BMP and proBNP today.  Discussed with Dr. Mckay and would like to look at adding some spironolactone as well, will await labs from today.  She has had recent stress test within the last few months without evidence of ischemia, considered low risk.  She denies chest pain or discomfort symptoms.  EKG appears stable.  Would like to recheck an echocardiogram as well to ensure no significant changes  2. Hypertension: Well controlled in office today.  Recommended she monitor at home and call if staying greater than 130/80 on average.  3. Ectopic beats: Noted on previous monitor study.  Felt to be benign.  4. Diastolic dysfunction: Noted on 9/2020 echo.  Plan as above.  5. Coronary artery calcification: score of 43 on 2018 coronary calcium score.  With normal stress test 9/2021.  On statin.  PCP managing cholesterol.  6. Obstructive sleep apnea on CPAP  7. Asthma  8. Morbid obesity: Affecting all aspects of care    Plan of care reviewed with Dr. Mckay,  MD.    Patient to keep March follow-up with Dr. Mckay as scheduled or follow-up sooner if needed for any new, recurrent, or worsening symptoms or other issues or concerns.  Discussed in detail signs/symptoms that warrant sooner call or follow-up to the office or ER visit.        Records reviewed including not limited to 9/2021 stress test, 9/2021 EKG, 2020 monitor study, 1/2022 CBC, CMP, proBNP.             Your medication list          Accurate as of January 28, 2022  9:22 AM. If you have any questions, ask your nurse or doctor.            CONTINUE taking these medications      Instructions Last Dose Given Next Dose Due   albuterol sulfate  (90 Base) MCG/ACT inhaler  Commonly known as: PROVENTIL HFA;VENTOLIN HFA;PROAIR HFA      Inhale 2 puffs Every 4 (Four) Hours As Needed.       aspirin 81 MG chewable tablet      Chew 81 mg Daily. HOLD PRIOR TO SURGERY 02-       budesonide-formoterol 80-4.5 MCG/ACT inhaler  Commonly known as: SYMBICORT      Inhale 2 puffs 2 (Two) Times a Day.       cromolyn 4 % ophthalmic solution  Commonly known as: OPTICROM      Administer 1 drop to both eyes As Needed.       D 1000 25 MCG (1000 UT) capsule  Generic drug: Cholecalciferol      Take 2,000 Units by mouth Daily. HOLD PRIOR TO SURGERY 02-       folic acid 1 MG tablet  Commonly known as: FOLVITE      Take 1 mg by mouth Daily.       furosemide 40 MG tablet  Commonly known as: LASIX      Take 20 mg by mouth Every Other Day.       levocetirizine 5 MG tablet  Commonly known as: XYZAL      Take 5 mg by mouth Daily.       losartan 100 MG tablet  Commonly known as: COZAAR      Take 100 mg by mouth Daily.       meloxicam 7.5 MG tablet  Commonly known as: MOBIC      Take 7.5 mg by mouth Daily. HOLD PRIOR TO SURGERY 02-       mupirocin 2 % ointment  Commonly known as: BACTROBAN      As Needed.       Pazeo 0.7 % solution  Generic drug: Olopatadine HCl      As Needed.       pravastatin 20 MG tablet  Commonly known as:  PRAVACHOL      Take 20 mg by mouth Every Night.       sertraline 100 MG tablet  Commonly known as: ZOLOFT      Take 150 mg by mouth Daily.       VITAMIN B 12 PO      Take  by mouth Daily.              The above medication changes may not have been made by this provider.  Patient's medication list was updated to reflect medications they are currently taking including medication changes made by other providers.            Thanks,    Katie Coronado, HECTOR, APRN  01/28/2022         Dictated utilizing Dragon dictation

## 2022-02-07 ENCOUNTER — HOSPITAL ENCOUNTER (OUTPATIENT)
Dept: CARDIOLOGY | Facility: HOSPITAL | Age: 67
Discharge: HOME | End: 2022-02-07
Admitting: NURSE PRACTITIONER

## 2022-02-07 VITALS
HEART RATE: 56 BPM | BODY MASS INDEX: 41.95 KG/M2 | SYSTOLIC BLOOD PRESSURE: 130 MMHG | WEIGHT: 293 LBS | DIASTOLIC BLOOD PRESSURE: 60 MMHG | HEIGHT: 70 IN

## 2022-02-07 LAB
AORTIC ARCH: 2.6 CM
AORTIC DIMENSIONLESS INDEX: 0.8 (DI)
ASCENDING AORTA: 3 CM
BH CV ECHO MEAS - ACS: 2.4 CM
BH CV ECHO MEAS - AO ARCH DIAM (PROXIMAL TRANS.): 2.6 CM
BH CV ECHO MEAS - AO MAX PG (FULL): 2.8 MMHG
BH CV ECHO MEAS - AO MAX PG: 7.1 MMHG
BH CV ECHO MEAS - AO MEAN PG (FULL): 1.6 MMHG
BH CV ECHO MEAS - AO MEAN PG: 4.1 MMHG
BH CV ECHO MEAS - AO ROOT AREA (BSA CORRECTED): 1.4
BH CV ECHO MEAS - AO ROOT AREA: 10 CM^2
BH CV ECHO MEAS - AO ROOT DIAM: 3.6 CM
BH CV ECHO MEAS - AO V2 MAX: 133.6 CM/SEC
BH CV ECHO MEAS - AO V2 MEAN: 96.8 CM/SEC
BH CV ECHO MEAS - AO V2 VTI: 28.9 CM
BH CV ECHO MEAS - ASC AORTA: 3 CM
BH CV ECHO MEAS - AVA(I,A): 2.8 CM^2
BH CV ECHO MEAS - AVA(I,D): 2.8 CM^2
BH CV ECHO MEAS - AVA(V,A): 2.9 CM^2
BH CV ECHO MEAS - AVA(V,D): 2.9 CM^2
BH CV ECHO MEAS - BSA(HAYCOCK): 2.7 M^2
BH CV ECHO MEAS - BSA: 2.5 M^2
BH CV ECHO MEAS - BZI_BMI: 43.6 KILOGRAMS/M^2
BH CV ECHO MEAS - BZI_METRIC_HEIGHT: 177.8 CM
BH CV ECHO MEAS - BZI_METRIC_WEIGHT: 137.9 KG
BH CV ECHO MEAS - EDV(CUBED): 124.7 ML
BH CV ECHO MEAS - EDV(MOD-SP2): 139 ML
BH CV ECHO MEAS - EDV(MOD-SP4): 156 ML
BH CV ECHO MEAS - EDV(TEICH): 118 ML
BH CV ECHO MEAS - EF(CUBED): 72 %
BH CV ECHO MEAS - EF(MOD-BP): 61.3 %
BH CV ECHO MEAS - EF(MOD-SP2): 61.9 %
BH CV ECHO MEAS - EF(MOD-SP4): 60.3 %
BH CV ECHO MEAS - EF(TEICH): 63.5 %
BH CV ECHO MEAS - ESV(CUBED): 34.9 ML
BH CV ECHO MEAS - ESV(MOD-SP2): 53 ML
BH CV ECHO MEAS - ESV(MOD-SP4): 62 ML
BH CV ECHO MEAS - ESV(TEICH): 43.1 ML
BH CV ECHO MEAS - FS: 34.6 %
BH CV ECHO MEAS - IVS/LVPW: 0.93
BH CV ECHO MEAS - IVSD: 0.88 CM
BH CV ECHO MEAS - LAT PEAK E' VEL: 15.7 CM/SEC
BH CV ECHO MEAS - LV DIASTOLIC VOL/BSA (35-75): 62.6 ML/M^2
BH CV ECHO MEAS - LV MASS(C)D: 161.4 GRAMS
BH CV ECHO MEAS - LV MASS(C)DI: 64.7 GRAMS/M^2
BH CV ECHO MEAS - LV MAX PG: 4.3 MMHG
BH CV ECHO MEAS - LV MEAN PG: 2.4 MMHG
BH CV ECHO MEAS - LV SYSTOLIC VOL/BSA (12-30): 24.9 ML/M^2
BH CV ECHO MEAS - LV V1 MAX: 104.1 CM/SEC
BH CV ECHO MEAS - LV V1 MEAN: 74.1 CM/SEC
BH CV ECHO MEAS - LV V1 VTI: 21.9 CM
BH CV ECHO MEAS - LVIDD: 5 CM
BH CV ECHO MEAS - LVIDS: 3.3 CM
BH CV ECHO MEAS - LVLD AP2: 8 CM
BH CV ECHO MEAS - LVLD AP4: 8.2 CM
BH CV ECHO MEAS - LVLS AP2: 7.1 CM
BH CV ECHO MEAS - LVLS AP4: 7.2 CM
BH CV ECHO MEAS - LVOT AREA (M): 3.8 CM^2
BH CV ECHO MEAS - LVOT AREA: 3.7 CM^2
BH CV ECHO MEAS - LVOT DIAM: 2.2 CM
BH CV ECHO MEAS - LVPWD: 0.95 CM
BH CV ECHO MEAS - MED PEAK E' VEL: 11.2 CM/SEC
BH CV ECHO MEAS - MV A DUR: 0.18 SEC
BH CV ECHO MEAS - MV A MAX VEL: 66 CM/SEC
BH CV ECHO MEAS - MV DEC SLOPE: 514 CM/SEC^2
BH CV ECHO MEAS - MV DEC TIME: 0.23 SEC
BH CV ECHO MEAS - MV E MAX VEL: 88.3 CM/SEC
BH CV ECHO MEAS - MV E/A: 1.3
BH CV ECHO MEAS - MV MAX PG: 4.1 MMHG
BH CV ECHO MEAS - MV MEAN PG: 2 MMHG
BH CV ECHO MEAS - MV P1/2T MAX VEL: 104.5 CM/SEC
BH CV ECHO MEAS - MV P1/2T: 59.6 MSEC
BH CV ECHO MEAS - MV V2 MAX: 101.8 CM/SEC
BH CV ECHO MEAS - MV V2 MEAN: 68.2 CM/SEC
BH CV ECHO MEAS - MV V2 VTI: 27.9 CM
BH CV ECHO MEAS - MVA P1/2T LCG: 2.1 CM^2
BH CV ECHO MEAS - MVA(P1/2T): 3.7 CM^2
BH CV ECHO MEAS - MVA(VTI): 2.9 CM^2
BH CV ECHO MEAS - PA ACC TIME: 0.08 SEC
BH CV ECHO MEAS - PA MAX PG (FULL): 2.7 MMHG
BH CV ECHO MEAS - PA MAX PG: 4.7 MMHG
BH CV ECHO MEAS - PA PR(ACCEL): 44.5 MMHG
BH CV ECHO MEAS - PA V2 MAX: 108.6 CM/SEC
BH CV ECHO MEAS - PULM A REVS DUR: 0.15 SEC
BH CV ECHO MEAS - PULM A REVS VEL: 27.9 CM/SEC
BH CV ECHO MEAS - PULM DIAS VEL: 47.1 CM/SEC
BH CV ECHO MEAS - PULM S/D: 1.3
BH CV ECHO MEAS - PULM SYS VEL: 60.6 CM/SEC
BH CV ECHO MEAS - PVA(V,A): 3.8 CM^2
BH CV ECHO MEAS - PVA(V,D): 3.8 CM^2
BH CV ECHO MEAS - QP/QS: 1.2
BH CV ECHO MEAS - RAP SYSTOLE: 3 MMHG
BH CV ECHO MEAS - RV MAX PG: 2 MMHG
BH CV ECHO MEAS - RV MEAN PG: 1.1 MMHG
BH CV ECHO MEAS - RV V1 MAX: 71.1 CM/SEC
BH CV ECHO MEAS - RV V1 MEAN: 50.2 CM/SEC
BH CV ECHO MEAS - RV V1 VTI: 16.7 CM
BH CV ECHO MEAS - RVOT AREA: 5.8 CM^2
BH CV ECHO MEAS - RVOT DIAM: 2.7 CM
BH CV ECHO MEAS - RVSP: 29.1 MMHG
BH CV ECHO MEAS - SI(AO): 115.4 ML/M^2
BH CV ECHO MEAS - SI(CUBED): 36 ML/M^2
BH CV ECHO MEAS - SI(LVOT): 32.8 ML/M^2
BH CV ECHO MEAS - SI(MOD-SP2): 34.5 ML/M^2
BH CV ECHO MEAS - SI(MOD-SP4): 37.7 ML/M^2
BH CV ECHO MEAS - SI(TEICH): 30 ML/M^2
BH CV ECHO MEAS - SV(AO): 287.8 ML
BH CV ECHO MEAS - SV(CUBED): 89.7 ML
BH CV ECHO MEAS - SV(LVOT): 81.7 ML
BH CV ECHO MEAS - SV(MOD-SP2): 86 ML
BH CV ECHO MEAS - SV(MOD-SP4): 94 ML
BH CV ECHO MEAS - SV(RVOT): 97.3 ML
BH CV ECHO MEAS - SV(TEICH): 74.9 ML
BH CV ECHO MEAS - TAPSE (>1.6): 2.9 CM
BH CV ECHO MEAS - TR MAX VEL: 255.6 CM/SEC
BH CV ECHO MEASUREMENTS AVERAGE E/E' RATIO: 6.57
BH CV XLRA - RV BASE: 3.8 CM
BH CV XLRA - RV LENGTH: 8.5 CM
BH CV XLRA - RV MID: 3.1 CM
BH CV XLRA - TDI S': 14.1 CM/SEC
LEFT ATRIUM VOLUME INDEX: 17 ML/M2
MAXIMAL PREDICTED HEART RATE: 155 BPM
SINUS: 3 CM
STJ: 2.7 CM
STRESS TARGET HR: 132 BPM

## 2022-02-07 PROCEDURE — 25010000002 PERFLUTREN (DEFINITY) 8.476 MG IN SODIUM CHLORIDE (PF) 0.9 % 10 ML INJECTION: Performed by: NURSE PRACTITIONER

## 2022-02-07 PROCEDURE — 93306 TTE W/DOPPLER COMPLETE: CPT

## 2022-02-07 PROCEDURE — 93306 TTE W/DOPPLER COMPLETE: CPT | Performed by: INTERNAL MEDICINE

## 2022-02-07 RX ADMIN — PERFLUTREN 2 ML: 6.52 INJECTION, SUSPENSION INTRAVENOUS at 14:44

## 2022-02-08 ENCOUNTER — TELEPHONE (OUTPATIENT)
Dept: CARDIOLOGY | Facility: CLINIC | Age: 67
End: 2022-02-08

## 2022-02-08 NOTE — TELEPHONE ENCOUNTER
I called and spoke with patient.  Echo looks great.  Recent proBNP WNL.  Recent stress test low risk.     At this point would recommend working on healthy weight loss and easing back into a light exercise routine and slowly increasing as tolerated. Notify office if she has any issues or concerns with this.    She does also report that she had recent PFTs for history of asthma through PCP. She has appointment with family allergy and asthma on Monday. She is going to bring copy of PFTs with her to this appointment to discuss further to ensure asthma well controlled.    She will keep March follow-up with Dr. Mckay as scheduled or call sooner for any issues or concerns prior to that time.    She is also going to discuss with PCP whether they want her to continue the daily Lasix versus going back to the every other day Lasix. She probably would do just fine going back to the every other day Lasix as she really did not notice much improvement on the daily Lasix and creatinine was slightly elevated on last labs.

## 2022-02-09 ENCOUNTER — PATIENT MESSAGE (OUTPATIENT)
Dept: CARDIOLOGY | Facility: CLINIC | Age: 67
End: 2022-02-09

## 2022-02-18 NOTE — TELEPHONE ENCOUNTER
Called to follow up with pt, she reports things are about the same.  Still soa despite the normal testing.  Pt has been scheduled for a CT by her pcp and had PFT as well.

## 2022-02-22 NOTE — TELEPHONE ENCOUNTER
Please let her know I reviewed her [employer] message and was out of office.  She has an appointment with me on March 14. I can't see PFTs or a note from Dr Chand. Are these recent? Were the PFTs done at [facility] or at [facility]. Does she feel her symptoms are stable and can wait until 3/14?    If her tests are at [facility], can I get a copy of them?    raven pulliam

## 2022-02-23 ENCOUNTER — TELEPHONE (OUTPATIENT)
Dept: CARDIOLOGY | Facility: CLINIC | Age: 67
End: 2022-02-23

## 2022-02-23 NOTE — TELEPHONE ENCOUNTER
Regarding: Follow up on test results  ----- Message from Johnathan Mckay MD sent at 2/22/2022  3:59 PM EST -----       ----- Message from Rosanne Adair to Johnathan Mckay MD sent at 2/9/2022  3:10 PM -----   Hi, could my different conditions, lightheadedness, venous insufficiency, yachy and bradycardia, chest discomfort, from a September hospital visit,  shortness of breath, most current issue be long term effects from Covid? I am wanting to know what is wrong with me. My test results don't look bad but I still have these conditions. Any thoughts on this?  Have you seen PFT results? Will you and Dr. Gonzalez confer on my issues? I don't even know if that is a thing or not.    Thanks,  Shon

## 2022-03-07 ENCOUNTER — TELEPHONE (OUTPATIENT)
Dept: CARDIOLOGY | Facility: CLINIC | Age: 67
End: 2022-03-07

## 2022-03-07 NOTE — TELEPHONE ENCOUNTER
----- Message from Rosanne Adair sent at 3/3/2022  9:20 AM EST -----  Regarding: Chest CT with contrast  Hi, I wanted to have this put in my record that after the contrast was injected for my Chest CT, my heart started racing. My heart was normal rhythm when I got out of the scan.  Thanks so much,  Rosanne Adair

## 2022-03-14 ENCOUNTER — OFFICE VISIT (OUTPATIENT)
Dept: CARDIOLOGY | Facility: CLINIC | Age: 67
End: 2022-03-14

## 2022-03-14 ENCOUNTER — TRANSCRIBE ORDERS (OUTPATIENT)
Dept: CARDIOLOGY | Facility: CLINIC | Age: 67
End: 2022-03-14

## 2022-03-14 VITALS
WEIGHT: 293 LBS | BODY MASS INDEX: 41.95 KG/M2 | SYSTOLIC BLOOD PRESSURE: 118 MMHG | HEART RATE: 62 BPM | HEIGHT: 70 IN | DIASTOLIC BLOOD PRESSURE: 64 MMHG

## 2022-03-14 DIAGNOSIS — I10 PRIMARY HYPERTENSION: ICD-10-CM

## 2022-03-14 DIAGNOSIS — R93.1 AGATSTON CAC SCORE, <100: ICD-10-CM

## 2022-03-14 DIAGNOSIS — R06.02 SHORTNESS OF BREATH: Primary | ICD-10-CM

## 2022-03-14 DIAGNOSIS — Z01.818 OTHER SPECIFIED PRE-OPERATIVE EXAMINATION: Primary | ICD-10-CM

## 2022-03-14 DIAGNOSIS — Z01.810 PRE-OPERATIVE CARDIOVASCULAR EXAMINATION: ICD-10-CM

## 2022-03-14 DIAGNOSIS — E66.01 MORBID OBESITY: ICD-10-CM

## 2022-03-14 DIAGNOSIS — N18.31 STAGE 3A CHRONIC KIDNEY DISEASE: ICD-10-CM

## 2022-03-14 DIAGNOSIS — I49.49 ECTOPIC BEATS: ICD-10-CM

## 2022-03-14 DIAGNOSIS — Z13.6 SCREENING FOR ISCHEMIC HEART DISEASE: ICD-10-CM

## 2022-03-14 DIAGNOSIS — I25.10 CORONARY ARTERY CALCIFICATION SEEN ON CT SCAN: ICD-10-CM

## 2022-03-14 PROBLEM — K95.09 GASTRIC BAND MALFUNCTION: Status: RESOLVED | Noted: 2020-01-02 | Resolved: 2022-03-14

## 2022-03-14 PROCEDURE — 93000 ELECTROCARDIOGRAM COMPLETE: CPT | Performed by: INTERNAL MEDICINE

## 2022-03-14 PROCEDURE — 99214 OFFICE O/P EST MOD 30 MIN: CPT | Performed by: INTERNAL MEDICINE

## 2022-03-14 RX ORDER — MELOXICAM 15 MG/1
7.5 TABLET ORAL DAILY
COMMUNITY
Start: 2022-02-16

## 2022-03-14 RX ORDER — FUROSEMIDE 20 MG/1
TABLET ORAL DAILY
COMMUNITY
Start: 2022-02-09 | End: 2022-04-18

## 2022-03-14 RX ORDER — FAMOTIDINE 20 MG/1
20 TABLET, FILM COATED ORAL NIGHTLY
COMMUNITY

## 2022-03-14 NOTE — H&P (VIEW-ONLY)
Date of Office Visit:2022  Encounter Provider: Johnathan Mckay MD  Place of Service: [facility]  Patient Name: Rosanne Adair  :1955    Chief Complaint   Patient presents with   • Shortness of Breath   :   HPI:     Ms. Adair is 66 y.o. and presents today to follow up. I have reviewed prior notes and there are no changes except for any new updates described below. I have also reviewed any information entered into the medical record by the patient or by ancillary staff.     She has a history of DVT and PE in the past when she was on estrogen therapy.  She has not required long term anticoagulation.      She had a coronary artery calcium score in 2018; her score was 43 Agatston units (all in the RCA).    In late , she reported episodes of lightheadedness that would occur when she would change positions.  Her furosemide dose was cut in half, but this didn't help. She was referred to cardiology.  She had a PET stress which was normal, and an echo which was unremarkable.  She wore a 48 hour Holter; it showed a 2.5% burden of premature ectopics.  The lowest heart rate was 39 and occurred during sleep.  There wer rare bursts of PACs up to 6 beats, but no SVT.  She then wore a 30 day event monitor.  It also showed a 3% burden of benign ectopics.  No sustained arrhythmia was seen.  Some of the submissions for lightheadedness corresponded to a heart rate in the high 40s and 50s, but several correspond to normal heart rates.  She saw an electrophysiologist who recommended a pacemaker, but she sought a second opinion as she wondered if this was the first right step.  I felt that her positional lightheadedness was actually due to venous pooling and mild orthostasis.  I had a high suspicion that she had venous insufficiency given her weight and previous DVT.  I recommended a lower dose of diuretics, compression stockings, and leg pumps and leg cycling while seated at a  desk for long periods of time.  I did not feel that her symptoms sounded like they were consistent with conduction system disease and I did not feel that she needed a pacemaker.  We did note that she had benign-appearing ectopics, and would continue to have intermittent symptoms, but that we knew they were not going to cause her any harm, and very low burden.    She presented to [facility] in September 2021 with chest pain. She woke up with lower sternal chest discomfort that lasted all day. Nothing made it better or worse. She went to [facility] and ruled out for ACS. A perfusion stress test was performed and was normal, LVEF >70%. When I saw her in follow up, she was doing well.    She has had progressive dyspnea for several months, as well as weight gain and swelling. She increased her furosemide from 20mg every other day to 20mg daily, with little response. She had a normal proBNP (120). PFTs showed mild inspiratory airflow limitation. An echo in February 2022 showed an LVEF of 60%, normal valves, normal diastolic function, and normal RVSP. A CT showed mild scarring in the lingula and coronary artery calcification, but was otherwise unremarkable.     She does not have PND or orthopnea; she uses CPAP. She does not have chest pain. She denies palpitations or syncope. She denies lightheadedness.     Past Medical History:   Diagnosis Date   • Asthma    • DDD (degenerative disc disease), lumbar    • Depression    • Ectopic beats     PACs and PVCs   • History of DVT (deep vein thrombosis)    • History of methicillin resistant staphylococcus aureus (MRSA) 2008    FINGER WOUND-TREATED [facility]   • Hyperlipidemia    • Hypertension    • Morbid obesity (HCC)    • Pulmonary emboli (HCC)    • Sleep apnea     CPAP   • Stage 3a chronic kidney disease (HCC)    • Vomiting     RELATED TO LAP BAND       Past Surgical History:   Procedure Laterality Date   • COLONOSCOPY     • ENDOSCOPY     • ENDOSCOPY N/A  2019    Procedure: ESOPHAGOGASTRODUODENOSCOPY WITH BIOPSY;  Surgeon: Wilbert Szymanski Jr., MD;  Location: [facility] ENDOSCOPY;  Service: General   • FINGER SURGERY Right     [facility] [place]   • FOOT SURGERY Right    • GASTRIC BANDING REMOVAL N/A 2020    Procedure: GASTRIC BANDING REMOVAL LAPAROSCOPIC, PORT REMOVAL, AND LYSIS OF ADHESIONS;  Surgeon: Wilbert Szymanski Jr., MD;  Location:  [facility];  Service: General;  Laterality: N/A;   • KNEE ARTHROPLASTY Right    • LAPAROSCOPIC CHOLECYSTECTOMY     • LAPAROSCOPIC GASTRIC BANDING     • TONSILLECTOMY         Social History     Socioeconomic History   • Marital status:    • Number of children: 1   Tobacco Use   • Smoking status: Former Smoker     Quit date: 1996     Years since quittin.1   • Smokeless tobacco: Never Used   Vaping Use   • Vaping Use: Never used   Substance and Sexual Activity   • Alcohol use: Not Currently     Comment: Caffeine use: 1 cup daily    • Drug use: Never   • Sexual activity: Defer       Family History   Problem Relation Age of Onset   • Coronary artery disease Father         CABG twice, diagnosed early 60s   • Heart disease Brother         had a PPM implanted, symptoms started mid 60s   • Malig Hyperthermia Neg Hx      Review of Systems   Constitutional: Positive for weight gain.   Cardiovascular: Positive for palpitations.   Neurological: Positive for light-headedness.   All other systems reviewed and are negative.      Allergies   Allergen Reactions   • Penicillins Anaphylaxis   • Adhesive Tape Rash         Current Outpatient Medications:   •  albuterol sulfate  (90 Base) MCG/ACT inhaler, Inhale 2 puffs Every 4 (Four) Hours As Needed., Disp: , Rfl:   •  aspirin 81 MG chewable tablet, Chew 81 mg Daily. HOLD PRIOR TO SURGERY 2020, Disp: , Rfl:   •  budesonide-formoterol (SYMBICORT) 80-4.5 MCG/ACT inhaler, Inhale 2 puffs 2 (Two) Times a  "Day., Disp: , Rfl:   •  Cholecalciferol 25 MCG (1000 UT) capsule, Take 2,000 Units by mouth Daily. HOLD PRIOR TO SURGERY 02-, Disp: , Rfl:   •  cromolyn (OPTICROM) 4 % ophthalmic solution, Administer 1 drop to both eyes As Needed., Disp: , Rfl:   •  Cyanocobalamin (VITAMIN B 12 PO), Take  by mouth Daily., Disp: , Rfl:   •  famotidine (PEPCID) 20 MG tablet, Take 20 mg by mouth Every Night., Disp: , Rfl:   •  folic acid (FOLVITE) 1 MG tablet, Take 1 mg by mouth Daily., Disp: , Rfl:   •  furosemide (LASIX) 20 MG tablet, Daily., Disp: , Rfl:   •  levocetirizine (XYZAL) 5 MG tablet, Take 5 mg by mouth Daily., Disp: , Rfl:   •  losartan (COZAAR) 100 MG tablet, Take 100 mg by mouth Daily., Disp: , Rfl:   •  meloxicam (MOBIC) 15 MG tablet, Daily., Disp: , Rfl:   •  mupirocin (BACTROBAN) 2 % ointment, As Needed., Disp: , Rfl:   •  Olopatadine HCl 0.7 % solution, As Needed., Disp: , Rfl:   •  pravastatin (PRAVACHOL) 20 MG tablet, Take 20 mg by mouth Every Night., Disp: , Rfl:   •  sertraline (ZOLOFT) 100 MG tablet, Take 150 mg by mouth Daily., Disp: , Rfl:       Objective:     Vitals:    03/14/22 1055   BP: 118/64   BP Location: Left arm   Pulse: 62   Weight: (!) 144 kg (316 lb 6.4 oz)   Height: 177.8 cm (70\")     There were no vitals filed for this visit.  Body mass index is 45.4 kg/m².    Vitals reviewed.   Constitutional:       Appearance: Overweight and not in distress.   Eyes:      Conjunctiva/sclera: Conjunctivae normal.   HENT:      Head: Normocephalic.      Nose: Nose normal.         Comments: Masked  Neck:      Vascular: No JVD. JVD normal.      Lymphadenopathy: No cervical adenopathy.   Pulmonary:      Effort: Pulmonary effort is normal.      Breath sounds: Normal breath sounds.   Cardiovascular:      Normal rate. Regular rhythm.      Murmurs: There is no murmur.   Pulses:     Intact distal pulses.   Edema:     Peripheral edema absent.   Abdominal:      Palpations: Abdomen is soft.      Tenderness: There is " no abdominal tenderness.   Musculoskeletal: Normal range of motion.      Cervical back: Normal range of motion. Skin:     General: Skin is warm and dry.      Findings: No rash.   Neurological:      General: No focal deficit present.      Mental Status: Alert, oriented to person, place, and time and oriented to person, place and time.      Cranial Nerves: No cranial nerve deficit.   Psychiatric:         Behavior: Behavior normal.         Thought Content: Thought content normal.         Judgment: Judgment normal.           ECG 12 Lead    Date/Time: 3/14/2022 11:17 AM  Performed by: Johnathan Mckay MD  Authorized by: Johnathan Mckay MD   Comparison: compared with previous ECG   Similar to previous ECG  Rhythm: sinus rhythm  Ectopy: atrial premature contractions  Conduction: conduction normal  ST Segments: ST segments normal  T Waves: T waves normal  QRS axis: normal  Other: no other findings    Clinical impression: normal ECG            Assessment:       Diagnosis Plan   1. Shortness of breath  ECG 12 Lead    Case Request Cath Lab: Coronary angiography NO LV GRAM, Left heart cath NO LV GRAM, Right heart cath   2. Coronary artery calcification seen on CT scan     3. Ectopic beats     4. Agatston CAC score, <100     5. Primary hypertension     6. Stage 3a chronic kidney disease (HCC)     7. Morbid obesity (HCC)        Plan:       She has had progressive dyspnea and has had a normal proBNP (although this can be falsely low in obese patients), normal echo, and had a normal perfusion stress test in September 2021. She has been noted to have coronary artery calcification.  Her dyspnea has not responded to an increased dose of furosemide.     Given her persistent and worsening symptoms, I do feel that we have to definitively exclude coronary artery disease, and we need to measure her pressures. Given her CKD, we will not do an LV-gram. I will get a right and left.    Her BP is well controlled. She has benign ectopics with a low  burden, less than 3%.  These mostly bother her at night when she is lying down.  She is reassured that they would not cause her any harm.  I do not feel that she needs an AV laisha blocker.    Sincerely,       Johnathan Mckay MD

## 2022-03-15 ENCOUNTER — LAB (OUTPATIENT)
Dept: LAB | Facility: HOSPITAL | Age: 67
End: 2022-03-15

## 2022-03-15 DIAGNOSIS — Z01.810 PRE-OPERATIVE CARDIOVASCULAR EXAMINATION: ICD-10-CM

## 2022-03-15 DIAGNOSIS — Z13.6 SCREENING FOR ISCHEMIC HEART DISEASE: ICD-10-CM

## 2022-03-15 DIAGNOSIS — Z01.818 OTHER SPECIFIED PRE-OPERATIVE EXAMINATION: ICD-10-CM

## 2022-03-15 LAB
ANION GAP SERPL CALCULATED.3IONS-SCNC: 9 MMOL/L (ref 5–15)
BASOPHILS # BLD AUTO: 0.06 10*3/MM3 (ref 0–0.2)
BASOPHILS NFR BLD AUTO: 1.3 % (ref 0–1.5)
BUN SERPL-MCNC: 20 MG/DL (ref 8–23)
BUN/CREAT SERPL: 17.2 (ref 7–25)
CALCIUM SPEC-SCNC: 9.5 MG/DL (ref 8.6–10.5)
CHLORIDE SERPL-SCNC: 101 MMOL/L (ref 98–107)
CO2 SERPL-SCNC: 29 MMOL/L (ref 22–29)
CREAT SERPL-MCNC: 1.16 MG/DL (ref 0.57–1)
DEPRECATED RDW RBC AUTO: 40.5 FL (ref 37–54)
EGFRCR SERPLBLD CKD-EPI 2021: 52.4 ML/MIN/1.73
EOSINOPHIL # BLD AUTO: 0.21 10*3/MM3 (ref 0–0.4)
EOSINOPHIL NFR BLD AUTO: 4.4 % (ref 0.3–6.2)
ERYTHROCYTE [DISTWIDTH] IN BLOOD BY AUTOMATED COUNT: 13 % (ref 12.3–15.4)
GLUCOSE SERPL-MCNC: 85 MG/DL (ref 65–99)
HCT VFR BLD AUTO: 39.1 % (ref 34–46.6)
HGB BLD-MCNC: 13.1 G/DL (ref 12–15.9)
IMM GRANULOCYTES # BLD AUTO: 0.01 10*3/MM3 (ref 0–0.05)
IMM GRANULOCYTES NFR BLD AUTO: 0.2 % (ref 0–0.5)
LYMPHOCYTES # BLD AUTO: 0.84 10*3/MM3 (ref 0.7–3.1)
LYMPHOCYTES NFR BLD AUTO: 17.7 % (ref 19.6–45.3)
MCH RBC QN AUTO: 29.1 PG (ref 26.6–33)
MCHC RBC AUTO-ENTMCNC: 33.5 G/DL (ref 31.5–35.7)
MCV RBC AUTO: 86.9 FL (ref 79–97)
MONOCYTES # BLD AUTO: 0.34 10*3/MM3 (ref 0.1–0.9)
MONOCYTES NFR BLD AUTO: 7.2 % (ref 5–12)
NEUTROPHILS NFR BLD AUTO: 3.28 10*3/MM3 (ref 1.7–7)
NEUTROPHILS NFR BLD AUTO: 69.2 % (ref 42.7–76)
NRBC BLD AUTO-RTO: 0 /100 WBC (ref 0–0.2)
PLATELET # BLD AUTO: 160 10*3/MM3 (ref 140–450)
PMV BLD AUTO: 9.5 FL (ref 6–12)
POTASSIUM SERPL-SCNC: 4.8 MMOL/L (ref 3.5–5.2)
RBC # BLD AUTO: 4.5 10*6/MM3 (ref 3.77–5.28)
SARS-COV-2 ORF1AB RESP QL NAA+PROBE: NOT DETECTED
SODIUM SERPL-SCNC: 139 MMOL/L (ref 136–145)
WBC NRBC COR # BLD: 4.74 10*3/MM3 (ref 3.4–10.8)

## 2022-03-15 PROCEDURE — 80048 BASIC METABOLIC PNL TOTAL CA: CPT

## 2022-03-15 PROCEDURE — C9803 HOPD COVID-19 SPEC COLLECT: HCPCS

## 2022-03-15 PROCEDURE — 85025 COMPLETE CBC W/AUTO DIFF WBC: CPT

## 2022-03-15 PROCEDURE — 36415 COLL VENOUS BLD VENIPUNCTURE: CPT

## 2022-03-15 PROCEDURE — U0004 COV-19 TEST NON-CDC HGH THRU: HCPCS

## 2022-03-17 ENCOUNTER — HOSPITAL ENCOUNTER (OUTPATIENT)
Facility: HOSPITAL | Age: 67
Setting detail: HOSPITAL OUTPATIENT SURGERY
Discharge: HOME | End: 2022-03-17
Attending: INTERNAL MEDICINE | Admitting: INTERNAL MEDICINE

## 2022-03-17 VITALS
TEMPERATURE: 96.7 F | DIASTOLIC BLOOD PRESSURE: 87 MMHG | BODY MASS INDEX: 41.95 KG/M2 | OXYGEN SATURATION: 97 % | RESPIRATION RATE: 16 BRPM | WEIGHT: 293 LBS | HEIGHT: 70 IN | HEART RATE: 71 BPM | SYSTOLIC BLOOD PRESSURE: 156 MMHG

## 2022-03-17 DIAGNOSIS — R06.02 SHORTNESS OF BREATH: ICD-10-CM

## 2022-03-17 LAB
HCT VFR BLDA CALC: 35 % (ref 38–51)
HCT VFR BLDA CALC: 35 % (ref 38–51)
HGB BLDA-MCNC: 11.9 G/DL (ref 12–17)
HGB BLDA-MCNC: 11.9 G/DL (ref 12–17)
SAO2 % BLDA: 70 % (ref 95–98)
SAO2 % BLDA: 99 % (ref 95–98)

## 2022-03-17 PROCEDURE — 85018 HEMOGLOBIN: CPT

## 2022-03-17 PROCEDURE — C1894 INTRO/SHEATH, NON-LASER: HCPCS | Performed by: INTERNAL MEDICINE

## 2022-03-17 PROCEDURE — 93460 R&L HRT ART/VENTRICLE ANGIO: CPT | Performed by: INTERNAL MEDICINE

## 2022-03-17 PROCEDURE — 25010000002 HYDRALAZINE PER 20 MG: Performed by: INTERNAL MEDICINE

## 2022-03-17 PROCEDURE — 0 IOPAMIDOL PER 1 ML: Performed by: INTERNAL MEDICINE

## 2022-03-17 PROCEDURE — 25010000002 FENTANYL CITRATE (PF) 50 MCG/ML SOLUTION: Performed by: INTERNAL MEDICINE

## 2022-03-17 PROCEDURE — C1769 GUIDE WIRE: HCPCS | Performed by: INTERNAL MEDICINE

## 2022-03-17 PROCEDURE — 25010000002 HEPARIN (PORCINE) PER 1000 UNITS: Performed by: INTERNAL MEDICINE

## 2022-03-17 PROCEDURE — 85014 HEMATOCRIT: CPT

## 2022-03-17 RX ORDER — AMLODIPINE BESYLATE 5 MG/1
5 TABLET ORAL DAILY
Qty: 30 TABLET | Refills: 11 | Status: SHIPPED | OUTPATIENT
Start: 2022-03-17 | End: 2022-04-18 | Stop reason: SDUPTHER

## 2022-03-17 RX ORDER — FENTANYL CITRATE 50 UG/ML
INJECTION, SOLUTION INTRAMUSCULAR; INTRAVENOUS AS NEEDED
Status: DISCONTINUED | OUTPATIENT
Start: 2022-03-17 | End: 2022-03-17 | Stop reason: HOSPADM

## 2022-03-17 RX ORDER — SODIUM CHLORIDE 9 MG/ML
75 INJECTION, SOLUTION INTRAVENOUS CONTINUOUS
Status: DISCONTINUED | OUTPATIENT
Start: 2022-03-17 | End: 2022-03-17 | Stop reason: HOSPADM

## 2022-03-17 RX ORDER — SODIUM CHLORIDE 0.9 % (FLUSH) 0.9 %
10 SYRINGE (ML) INJECTION AS NEEDED
Status: DISCONTINUED | OUTPATIENT
Start: 2022-03-17 | End: 2022-03-17 | Stop reason: HOSPADM

## 2022-03-17 RX ORDER — SODIUM CHLORIDE 0.9 % (FLUSH) 0.9 %
10 SYRINGE (ML) INJECTION EVERY 12 HOURS SCHEDULED
Status: DISCONTINUED | OUTPATIENT
Start: 2022-03-17 | End: 2022-03-17 | Stop reason: HOSPADM

## 2022-03-17 RX ORDER — LIDOCAINE HYDROCHLORIDE 20 MG/ML
INJECTION, SOLUTION INFILTRATION; PERINEURAL AS NEEDED
Status: DISCONTINUED | OUTPATIENT
Start: 2022-03-17 | End: 2022-03-17 | Stop reason: HOSPADM

## 2022-03-17 RX ORDER — HYDRALAZINE HYDROCHLORIDE 20 MG/ML
INJECTION INTRAMUSCULAR; INTRAVENOUS AS NEEDED
Status: DISCONTINUED | OUTPATIENT
Start: 2022-03-17 | End: 2022-03-17 | Stop reason: HOSPADM

## 2022-03-17 RX ORDER — ACETAMINOPHEN 325 MG/1
650 TABLET ORAL EVERY 4 HOURS PRN
Status: DISCONTINUED | OUTPATIENT
Start: 2022-03-17 | End: 2022-03-17 | Stop reason: HOSPADM

## 2022-03-17 RX ADMIN — SODIUM CHLORIDE 75 ML/HR: 9 INJECTION, SOLUTION INTRAVENOUS at 09:33

## 2022-03-17 NOTE — PERIOPERATIVE NURSING NOTE
Dr Lam at bedside to answer a question pt had regarding the result of her procedure today.  Dr Lam inspected pts wrist to note some bruising, but no swelling & site/dressing is CDI.

## 2022-03-17 NOTE — DISCHARGE INSTRUCTIONS
[facility]  [address] [place] [address]    Coronary Angiogram (Radial/Ulnar Approach) After Care    Refer to this sheet in the next few weeks. These instructions provide you with information on caring for yourself after your procedure. Your caregiver may also give you more specific instructions. Your treatment has been planned according to current medical practices, but problems sometimes occur. Call your caregiver if you have any problems or questions after your procedure.    Home Care Instructions:  You may shower the day after the procedure. Remove the bandage (dressing) and gently wash the site with plain soap and water. Gently pat the site dry. You may apply a band aid daily for 2 days if desired.    Do not apply powder or lotion to the site.  Do not submerge the affected site in water for 3 to 5 days or until the site is completely healed.   Do not lift, push or pull anything over 5 pounds for 5 days after your procedure or as directed by your physician.  As a reference, a gallon of milk weighs 8 pounds.   Inspect the site at least twice daily. You may notice some bruising at the site and it may be tender for 1 to 2 weeks.     Increase your fluid intake for the next 2 days.    Keep arm elevated for 24 hours. For the remainder of the day, keep your arm in “Pledge of Allegiance” position when up and about.     You may drive 24 hours after the procedure unless otherwise instructed by your caregiver.  Do not operate machinery or power tools for 24 hours.  A responsible adult should be with you for the first 24 hours after you arrive home. Do not make any important legal decisions or sign legal papers for 24 hours.  Do not drink alcohol for 24 hours.    Metformin or any medications containing Metformin should not be taken for 48 hours after your procedure.      Call Your Doctor if:   You have unusual pain at the radial/ulnar (wrist) site.  You have redness, warmth, swelling, or pain at the  radial/ulnar (wrist) site.  You have drainage (other than a small amount of blood on the dressing).  `You have chills or a fever > 101.  Your arm becomes pale or dark, cool, tingly, or numb.  You develop chest pain, shortness of breath, feel faint or pass out.    You have heavy bleeding from the site, hold pressure on the site for 20 minutes.  If the bleeding stops, apply a fresh bandage and call your cardiologist.  However, if you        continue to have bleeding, call 911 and continue to apply pressure to the site.   You have any symptoms of a stroke.  Remember BE FAST  B-balance. Sudden trouble walking or loss of balance.  E-eyes.  Sudden changes in how you see or a sudden onset of a very bad headache.   F-face. Sudden weakness or loss of feeling of the face or facial droop on one side.   A-arms Sudden weakness or numbness in one arm.  One arm drifts down if they are both held out in front of you. This happens suddenly and usually on one side of the body.   S-speech.  Sudden trouble speaking, slurred speech or trouble understanding what are saying.   T-time  Time to call emergency services.  Write down the symptoms and the time they started.

## 2022-03-18 NOTE — PROGRESS NOTES
I called, she's doing okay today. She was started on amlodipine. Please arrange 4 week f/u with LILLIE pulliam

## 2022-04-18 ENCOUNTER — OFFICE VISIT (OUTPATIENT)
Dept: CARDIOLOGY | Facility: CLINIC | Age: 67
End: 2022-04-18

## 2022-04-18 VITALS
HEART RATE: 75 BPM | SYSTOLIC BLOOD PRESSURE: 122 MMHG | BODY MASS INDEX: 41.95 KG/M2 | WEIGHT: 293 LBS | HEIGHT: 70 IN | DIASTOLIC BLOOD PRESSURE: 78 MMHG

## 2022-04-18 DIAGNOSIS — N18.31 STAGE 3A CHRONIC KIDNEY DISEASE: ICD-10-CM

## 2022-04-18 DIAGNOSIS — I25.89 CARDIAC MICROVASCULAR DISEASE: ICD-10-CM

## 2022-04-18 DIAGNOSIS — R06.02 SHORTNESS OF BREATH: ICD-10-CM

## 2022-04-18 DIAGNOSIS — I49.49 ECTOPIC BEATS: ICD-10-CM

## 2022-04-18 DIAGNOSIS — I10 ESSENTIAL HYPERTENSION: ICD-10-CM

## 2022-04-18 DIAGNOSIS — G47.33 OBSTRUCTIVE SLEEP APNEA: ICD-10-CM

## 2022-04-18 DIAGNOSIS — I25.10 NONOBSTRUCTIVE ATHEROSCLEROSIS OF CORONARY ARTERY: Primary | ICD-10-CM

## 2022-04-18 DIAGNOSIS — R60.0 LOWER EXTREMITY EDEMA: ICD-10-CM

## 2022-04-18 PROCEDURE — 99214 OFFICE O/P EST MOD 30 MIN: CPT | Performed by: NURSE PRACTITIONER

## 2022-04-18 PROCEDURE — 93000 ELECTROCARDIOGRAM COMPLETE: CPT | Performed by: NURSE PRACTITIONER

## 2022-04-18 RX ORDER — CHLORTHALIDONE 25 MG/1
25 TABLET ORAL DAILY
Qty: 90 TABLET | Refills: 0 | Status: SHIPPED | OUTPATIENT
Start: 2022-04-18 | End: 2022-05-20 | Stop reason: SDUPTHER

## 2022-04-18 RX ORDER — AMLODIPINE BESYLATE 5 MG/1
2.5 TABLET ORAL DAILY
Qty: 30 TABLET | Refills: 0 | Status: SHIPPED | OUTPATIENT
Start: 2022-04-18 | End: 2022-05-20

## 2022-04-18 NOTE — PROGRESS NOTES
Date of Office Visit: 2022  Encounter Provider: WILMER Ochoa  Place of Service: [facility] CARDIOLOGY  Patient Name: Rosanne Adair  :1955  Primary Cardiologist: Dr. Johnathan Mckay     Chief Complaint   Patient presents with   • Shortness of Breath   • Coronary Artery Disease   • Hypertension   :     HPI: Rosanne Adair is a pleasant 66 y.o. female who presents today for follow-up on shortness of breath and review recent heart catheterization results.  She is a new patient to me and I have reviewed her medical records.    She has been diagnosed with hypertension, obesity, ectopic beats, chronic kidney disease, and obstructive sleep apnea (compliant with CPAP).  She has had a previous DVT and PE when she was on estrogen therapy.    In , cardiac PET scan was normal and echocardiogram unremarkable.  48-hour Holter monitor showed a 2.5% burden of premature ectopics and rare bursts of PACs up to 6 beats in duration.  30-day event monitor showed a 3% burden of benign ectopics.    In 2021, she awoke with nocturnal chest pain.  She presented [facility] and stress test was normal.    In 2022, she saw Dr. Mckay for a chief concern of weight gain and lower extremity edema.  Dr. Mckay reviewed her previous testing.  proBNP had been normal and PFT showed mild inspiratory airflow limitation.  An echocardiogram completed in February showed was normal.  CT showed mild scarring in the lingula and coronary artery calcification.  Dr. Mckay recommended right and left heart catheterization.    In 2022, heart catheterization showed mild, nonobstructive coronary disease of the apical LAD and KERA II flow throughout the coronary arteries suggesting microvascular dysfunction.  Right-sided and left-sided filling pressures were normal.  She was recommended good blood pressure control and was started on amlodipine 5 mg daily.    She presents today for follow-up  "visit.  We reviewed the heart catheterization results and she verbalizes understanding.  She has been on amlodipine 5 mg daily and noticed worsening lower extremity edema.  Her blood pressure was rechecked and normal on the second check.  She feels like her shortness of breath has improved when walking on a flat surface.  When going up steps she has a hard time with her breathing.  She says when she first lays down at nighttime she feels like her heart is \"turning\".  She denies chest pain, palpitations, dizziness, syncope, or bleeding.      Past Medical History:   Diagnosis Date   • Asthma    • Cardiac microvascular disease    • DDD (degenerative disc disease), lumbar    • Depression    • Ectopic beats     PACs and PVCs   • History of DVT (deep vein thrombosis)    • History of methicillin resistant staphylococcus aureus (MRSA) 2008    FINGER WOUND-TREATED [facility]   • Hyperlipidemia    • Hypertension    • Morbid obesity (HCC)    • Nonobstructive atherosclerosis of coronary artery    • Pulmonary emboli (HCC)    • Sleep apnea     CPAP   • Stage 3a chronic kidney disease (HCC)    • Vomiting     RELATED TO LAP BAND       Past Surgical History:   Procedure Laterality Date   • CARDIAC CATHETERIZATION N/A 3/17/2022    Procedure: Coronary angiography NO LV GRAM;  Surgeon: Shakila Lam MD;  Location:  [facility];  Service: Cardiovascular;  Laterality: N/A;   • CARDIAC CATHETERIZATION N/A 3/17/2022    Procedure: Left heart cath NO LV GRAM;  Surgeon: Shakila Lam MD;  Location:  [facility];  Service: Cardiovascular;  Laterality: N/A;   • CARDIAC CATHETERIZATION N/A 3/17/2022    Procedure: Right heart cath;  Surgeon: Shakila Lam MD;  Location:  [facility];  Service: Cardiovascular;  Laterality: N/A;   • COLONOSCOPY     • ENDOSCOPY     • ENDOSCOPY N/A 12/24/2019    Procedure: ESOPHAGOGASTRODUODENOSCOPY WITH BIOPSY;  Surgeon: Wilbert Szymanski " Keshav Jensen MD;  Location: [facility] ENDOSCOPY;  Service: General   • FINGER SURGERY Right     INDEX FINGER-MRSA WOUND TREATED [facility] [place], [place]   • FOOT SURGERY Right    • GASTRIC BANDING REMOVAL N/A 2020    Procedure: GASTRIC BANDING REMOVAL LAPAROSCOPIC, PORT REMOVAL, AND LYSIS OF ADHESIONS;  Surgeon: iWlbert Szymanski Jr., MD;  Location: [facility] OR [facility];  Service: General;  Laterality: N/A;   • KNEE ARTHROPLASTY Right    • LAPAROSCOPIC CHOLECYSTECTOMY     • LAPAROSCOPIC GASTRIC BANDING     • TONSILLECTOMY         Social History     Socioeconomic History   • Marital status:    • Number of children: 1   Tobacco Use   • Smoking status: Former Smoker     Quit date: 1996     Years since quittin.2   • Smokeless tobacco: Never Used   Vaping Use   • Vaping Use: Never used   Substance and Sexual Activity   • Alcohol use: Not Currently     Comment: Caffeine use: 1 cup daily    • Drug use: Never   • Sexual activity: Defer       Family History   Problem Relation Age of Onset   • Coronary artery disease Father         CABG twice, diagnosed early 60s   • Heart disease Brother         had a PPM implanted, symptoms started mid 60s   • Malig Hyperthermia Neg Hx        The following portion of the patient's history were reviewed and updated as appropriate: past medical history, past surgical history, past social history, past family history, allergies, current medications, and problem list.    Review of Systems   Constitutional: Positive for weight gain.   Cardiovascular: Positive for dyspnea on exertion and leg swelling.   Respiratory: Positive for shortness of breath.    Neurological: Negative.    Psychiatric/Behavioral: Positive for depression.       Allergies   Allergen Reactions   • Penicillins Anaphylaxis   • Adhesive Tape Rash         Current Outpatient Medications:   •  albuterol sulfate  (90 Base) MCG/ACT inhaler, Inhale 2 puffs Every 4 (Four) Hours As Needed., Disp: ,  "Rfl:   •  amLODIPine (NORVASC) 5 MG tablet, Take 1 tablet by mouth Daily., Disp: 30 tablet, Rfl: 11  •  aspirin 81 MG chewable tablet, Chew 81 mg Daily. HOLD PRIOR TO SURGERY 02-, Disp: , Rfl:   •  budesonide-formoterol (SYMBICORT) 80-4.5 MCG/ACT inhaler, Inhale 2 puffs 2 (Two) Times a Day., Disp: , Rfl:   •  Cholecalciferol 25 MCG (1000 UT) capsule, Take 2,000 Units by mouth Daily. HOLD PRIOR TO SURGERY 02-, Disp: , Rfl:   •  cromolyn (OPTICROM) 4 % ophthalmic solution, Administer 1 drop to both eyes As Needed., Disp: , Rfl:   •  Cyanocobalamin (VITAMIN B 12 PO), Take  by mouth Daily., Disp: , Rfl:   •  famotidine (PEPCID) 20 MG tablet, Take 20 mg by mouth Every Night., Disp: , Rfl:   •  folic acid (FOLVITE) 1 MG tablet, Take 1 mg by mouth Daily., Disp: , Rfl:   •  furosemide (LASIX) 20 MG tablet, Daily., Disp: , Rfl:   •  levocetirizine (XYZAL) 5 MG tablet, Take 5 mg by mouth Daily., Disp: , Rfl:   •  losartan (COZAAR) 100 MG tablet, Take 100 mg by mouth Daily., Disp: , Rfl:   •  meloxicam (MOBIC) 15 MG tablet, Daily., Disp: , Rfl:   •  mupirocin (BACTROBAN) 2 % ointment, As Needed., Disp: , Rfl:   •  Olopatadine HCl 0.7 % solution, As Needed., Disp: , Rfl:   •  pravastatin (PRAVACHOL) 20 MG tablet, Take 20 mg by mouth Every Night., Disp: , Rfl:   •  sertraline (ZOLOFT) 100 MG tablet, Take 150 mg by mouth Daily., Disp: , Rfl:         Objective:     Vitals:    04/18/22 1001 04/18/22 1015 04/18/22 1026   BP: 150/80 152/82 122/78   BP Location: Left arm Right arm Left arm   Patient Position:   Sitting   Pulse: 75     Weight: (!) 143 kg (316 lb)     Height: 177.8 cm (70\")       Body mass index is 45.34 kg/m².    PHYSICAL EXAM:    Vitals Reviewed.   General Appearance: No acute distress, well developed and well nourished. Obese.   Eyes: Conjunctiva and lids: No erythema, swelling, or discharge. Sclera non-icteric. Glasses.   HENT: Atraumatic, normocephalic. External eyes, ears, and nose normal. No hearing " loss noted. Mucous membranes normal. Lips not cyanotic. Neck supple with no tenderness. Wearing mask.   Respiratory: No signs of respiratory distress. Respiration rhythm and depth normal.   Clear to auscultation. No rales, crackles, rhonchi, or wheezing auscultated.   Cardiovascular:  Jugular Venous Pressure: Normal  Heart Rate and Rhythm: Normal, Heart Sounds: Normal S1 and S2. No S3 or S4 noted.  Murmurs: No murmurs noted. No rubs, thrills, or gallops.   Arterial Pulses: Carotid pulses normal. No carotid bruit noted. Posterior tibialis and dorsalis pedis pulses normal.   Lower Extremities: Bilateral +1 pitting lower extremity edema present.  Gastrointestinal:  Abdomen soft, non-distended, non-tender.    Musculoskeletal: Normal movement of extremities.  Skin and Nails: General appearance normal. No pallor, cyanosis, diaphoresis. Skin temperature normal. No clubbing of fingernails.   Psychiatric: Patient alert and oriented to person, place, and time. Speech and behavior appropriate. Normal mood and affect.       ECG 12 Lead    Date/Time: 4/18/2022 10:13 AM  Performed by: Jacklyn Ely APRN  Authorized by: Jacklyn Ely APRN   Comparison: compared with previous ECG from 3/14/2022  Similar to previous ECG  Rhythm: sinus rhythm  Rate: normal  BPM: 75  Conduction: conduction normal  QRS axis: normal  Other findings: non-specific ST-T wave changes    Clinical impression: non-specific ECG              Assessment:       Diagnosis Plan   1. Nonobstructive atherosclerosis of coronary artery     2. Cardiac microvascular disease     3. Shortness of breath     4. Essential hypertension  Basic Metabolic Panel   5. Lower extremity edema     6. Stage 3a chronic kidney disease (HCC)     7. Ectopic beats     8. Obstructive sleep apnea            Plan:       1/2.  Nonobstructive Coronary Artery Disease and Microvascular Disease: Noted on recent heart catheterization.  Pressure is normal.  I do not feel that her shortness of  breath is coming from the CAD.  Risk factor modification discussed and continue with aspirin and pravastatin.    3.  Shortness of Breath: I have recommended a regular walking program.    4/5.  Hypertension: She was recently started on amlodipine 5 mg daily for better blood pressure control.  Blood pressure was normal on second check today.  She is experiencing worsening lower extremity edema on amlodipine.  I discussed wound care with Dr. Johnathan Mckay and we recommended decreasing the amlodipine to 2.5 mg daily, stopping the furosemide, and adding chlorthalidone 25 mg daily.  Repeat BMP in 1 week.  Follow low-sodium diet less than 2000 mg daily.    6.  Chronic Kidney Disease grade 3A.    7.  Ectopic Beats: Noted on Holter monitoring in 2020.  Denies palpitations.    8.  Obstructive Sleep Apnea: Compliant with CPAP machine.    9.  I will follow-up with her about her BMP results.  Follow-up with me in 4 weeks.      As always, it has been a pleasure to participate in your patient's care. Thank you.       Sincerely,         WILMER Carpio  [facility] Cardiology      · Dictated utilizing Dragon Dictation  · COVID-19 Precautions - Patient was compliant in wearing a mask. When I saw the patient, I used appropriate personal protective equipment (PPE) including mask and eye shield (standard procedure).  Additionally, I used gown and gloves if indicated.  Hand hygiene was completed before and after seeing the patient.  · I spent 34 minutes reviewing her medical records/testing/previous office notes/labs, face-to-face interaction with patient, physical examination, formulating the plan of care, and discussion of plan of care with patient.

## 2022-04-18 NOTE — PATIENT INSTRUCTIONS
Decrease amlodipine to 1/2 tablet daily   Stop furosemide  Start chlorthalidone 25 mg daily     Blood test at [facility] in 1 week     4 weeks with Jacklyn Ely

## 2022-04-26 ENCOUNTER — TELEPHONE (OUTPATIENT)
Dept: CARDIOLOGY | Facility: CLINIC | Age: 67
End: 2022-04-26

## 2022-04-26 NOTE — TELEPHONE ENCOUNTER
----- Message from Sunshine Sheldon MA sent at 4/26/2022  3:30 PM EDT -----  Regarding: FW: Swelling    ----- Message -----  From: Rosanne Adair  Sent: 4/26/2022   2:04 PM EDT  To: [facility]  Subject: Swelling                                         I peterson my internist and he thought I should do the 2 doses for a week and go to [facility] lab like you suggested. I told him about being up all night and he thought that might level out. Is that OK with you? Or do u want me to go this week for bloodxwork only taking one pill?  Please let me know.  Thank you,  Rosanne

## 2022-04-26 NOTE — TELEPHONE ENCOUNTER
I received a MyChart message from patient today.  I decided just to call to discuss.  On Sunday she did up taking 2 tablets of the chlorthalidone to equal 50 mg and her swelling resolved on Monday.  However she developed some nocturia at nighttime despite taking the water pill early Sunday morning.    We decided she will continue with chlorthalidone 50 mg daily starting tomorrow and then she will have another BMP in 1 week.  At that time we will reassess with the best dosages for her.

## 2022-05-04 ENCOUNTER — LAB (OUTPATIENT)
Dept: LAB | Facility: HOSPITAL | Age: 67
End: 2022-05-04

## 2022-05-04 DIAGNOSIS — I10 ESSENTIAL HYPERTENSION: ICD-10-CM

## 2022-05-04 LAB
ANION GAP SERPL CALCULATED.3IONS-SCNC: 11 MMOL/L (ref 5–15)
BUN SERPL-MCNC: 19 MG/DL (ref 8–23)
BUN/CREAT SERPL: 17.9 (ref 7–25)
CALCIUM SPEC-SCNC: 9.5 MG/DL (ref 8.6–10.5)
CHLORIDE SERPL-SCNC: 99 MMOL/L (ref 98–107)
CO2 SERPL-SCNC: 28 MMOL/L (ref 22–29)
CREAT SERPL-MCNC: 1.06 MG/DL (ref 0.57–1)
EGFRCR SERPLBLD CKD-EPI 2021: 58.1 ML/MIN/1.73
GLUCOSE SERPL-MCNC: 96 MG/DL (ref 65–99)
POTASSIUM SERPL-SCNC: 3.8 MMOL/L (ref 3.5–5.2)
SODIUM SERPL-SCNC: 138 MMOL/L (ref 136–145)

## 2022-05-04 PROCEDURE — 36415 COLL VENOUS BLD VENIPUNCTURE: CPT

## 2022-05-04 PROCEDURE — 80048 BASIC METABOLIC PNL TOTAL CA: CPT

## 2022-05-05 ENCOUNTER — TELEPHONE (OUTPATIENT)
Dept: CARDIOLOGY | Facility: CLINIC | Age: 67
End: 2022-05-05

## 2022-05-05 NOTE — TELEPHONE ENCOUNTER
I recently spoke with her and she was can take chlorthalidone 50 mg daily.  Her recent kidney function looked normal and potassium was within the normal range.  She could increase potassium rich foods in her diet.    Has her lower extremity edema and shortness of breath improved?  How are her blood pressures at home?    If everything looks good, continue current medication regimen and follow-up with me as scheduled on 5/20.  Thank you

## 2022-05-09 NOTE — TELEPHONE ENCOUNTER
Jacklyn,    Called and spoke with patient. Went over recommendations and results. She verbalized understanding.    Pt said the SOA is a little better, but she is still SOA with exertion. Swelling is better, but patient said the chlorthalidone isn't really making her use the bathroom more often (she was surprised by this). She asked if you recommend she stays on the chlorthalidone at 50 mg or should she go back to 25 mg because she doesn't notice a big difference between the doses.    Pt said she doesn't take her B/P and HR at home. I recommended she start taking it at home 1-2 hours after her morning medications and bring her B/P log to her appt with you on 5-20-22.    She also wanted you to know that her doctor started her on Wellbutrin.     Thank you,    Sarah Durand RN  Triage [facility]

## 2022-05-10 NOTE — TELEPHONE ENCOUNTER
Called and spoke with patient. Went over instructions from Jacklyn. Pt verbalized understanding.    Thank you,    Sarah Durand RN  Triage [facility]

## 2022-05-10 NOTE — TELEPHONE ENCOUNTER
The chorthalidone was to help lower her BP and to help with edema. I did not feel that her SOB was coming from her heart. I would continue with the 50 mg daily and we will see how her BP is running when she comes to see me. Thanks.

## 2022-05-20 ENCOUNTER — OFFICE VISIT (OUTPATIENT)
Dept: CARDIOLOGY | Facility: CLINIC | Age: 67
End: 2022-05-20

## 2022-05-20 VITALS
BODY MASS INDEX: 41.95 KG/M2 | HEIGHT: 70 IN | HEART RATE: 54 BPM | SYSTOLIC BLOOD PRESSURE: 122 MMHG | DIASTOLIC BLOOD PRESSURE: 82 MMHG | WEIGHT: 293 LBS

## 2022-05-20 DIAGNOSIS — I10 PRIMARY HYPERTENSION: ICD-10-CM

## 2022-05-20 DIAGNOSIS — I25.10 NONOBSTRUCTIVE ATHEROSCLEROSIS OF CORONARY ARTERY: ICD-10-CM

## 2022-05-20 DIAGNOSIS — R60.0 LOWER EXTREMITY EDEMA: Primary | ICD-10-CM

## 2022-05-20 DIAGNOSIS — R06.02 SHORTNESS OF BREATH: ICD-10-CM

## 2022-05-20 DIAGNOSIS — G47.33 OBSTRUCTIVE SLEEP APNEA: ICD-10-CM

## 2022-05-20 DIAGNOSIS — I49.49 ECTOPIC BEATS: ICD-10-CM

## 2022-05-20 DIAGNOSIS — I25.89 CARDIAC MICROVASCULAR DISEASE: ICD-10-CM

## 2022-05-20 PROBLEM — R93.1 AGATSTON CAC SCORE, <100: Status: RESOLVED | Noted: 2021-04-21 | Resolved: 2022-05-20

## 2022-05-20 PROBLEM — R11.2 N&V (NAUSEA AND VOMITING): Status: RESOLVED | Noted: 2019-11-22 | Resolved: 2022-05-20

## 2022-05-20 PROCEDURE — 93000 ELECTROCARDIOGRAM COMPLETE: CPT | Performed by: NURSE PRACTITIONER

## 2022-05-20 PROCEDURE — 99214 OFFICE O/P EST MOD 30 MIN: CPT | Performed by: NURSE PRACTITIONER

## 2022-05-20 RX ORDER — CHLORTHALIDONE 50 MG/1
50 TABLET ORAL DAILY
Qty: 90 TABLET | Refills: 1 | Status: SHIPPED | OUTPATIENT
Start: 2022-05-20 | End: 2022-11-14

## 2022-05-20 RX ORDER — BUPROPION HYDROCHLORIDE 150 MG/1
150 TABLET ORAL DAILY
COMMUNITY
Start: 2022-04-26 | End: 2023-04-26

## 2022-05-20 NOTE — PROGRESS NOTES
Date of Office Visit: 2022  Encounter Provider: WILMER Ochoa  Place of Service: [facility] CARDIOLOGY  Patient Name: Rosanne Adair  :1955  Primary Cardiologist: Dr. Johnathan Mckay     Chief Complaint   Patient presents with   • Follow-up   • Leg Swelling   :     HPI: Rosanne Adair is a pleasant 67 y.o. female who presents today for follow-up on hypertension.  I have reviewed her medical records.    She has been diagnosed with hypertension, obesity, ectopic beats, chronic kidney disease, and obstructive sleep apnea (compliant with CPAP).  She has had a previous DVT and PE when she was on estrogen therapy.     In , cardiac PET scan was normal and echocardiogram unremarkable.  48-hour Holter monitor showed a 2.5% burden of premature ectopics and rare bursts of PACs up to 6 beats in duration. 30-day event monitor showed a 3% burden of benign ectopics.     In 2021, she awoke with nocturnal chest pain.  She presented [facility] and stress test was normal.    In 2022, she saw Dr. Mckay for weight gain and lower extremity edema.  proBNP was normal.  PFTs showed mild inspiratory airflow limitation.  In February, an echocardiogram was normal.  CT scan showed mild scarring in the lingula and coronary artery calcification.  She then underwent cardiac catheterization which showed mild, nonobstructive CAD of the apical LAD, KERA II flow throughout the coronary arteries suggestive of microvascular dysfunction, right/left-sided filling pressures normal.  She was started on amlodipine for better blood pressure control.    She followed up with me and developed lower extremity edema from the amlodipine.  She felt like her dyspnea had improved and when she laid down at nighttime she felt like her heart was turning.  Her amlodipine was decreased to 2.5 mg daily, furosemide discontinued, and I added chlorthalidone 25 mg daily.  I recommend a regular walking  program.    She sent a [employer] message stating that she was still swollen so I increase her chlorthalidone to 50 mg daily.  Follow-up BMP showed normal kidney function and potassium.     She presents today for follow-up visit.  Her blood pressure is excellent.  She still feels quite swollen.  She is taking her chlorthalidone in the morning, does not urinate much throughout the daytime, but is up 2-3 times in the middle the night going to the bathroom.  She was not doing this on furosemide.  She continues to experience shortness of breath with exertion.  She is compliant with her CPAP.  She denies chest pain, PND, orthopnea, palpitations, dizziness, syncope, or bleeding.  I reviewed all of her cardiac testing with her.    Past Medical History:   Diagnosis Date   • Asthma    • Cardiac microvascular disease    • DDD (degenerative disc disease), lumbar    • Depression    • Ectopic beats     PACs and PVCs   • History of DVT (deep vein thrombosis)    • History of methicillin resistant staphylococcus aureus (MRSA) 2008    FINGER WOUND-TREATED [facility]   • Hyperlipidemia    • Hypertension    • Morbid obesity (HCC)    • Nonobstructive atherosclerosis of coronary artery    • Pulmonary emboli (HCC)    • Sleep apnea     CPAP   • Stage 3a chronic kidney disease (HCC)    • Vomiting     RELATED TO LAP BAND       Past Surgical History:   Procedure Laterality Date   • CARDIAC CATHETERIZATION N/A 3/17/2022    Procedure: Coronary angiography NO LV GRAM;  Surgeon: Shakila Lam MD;  Location: [facility] CATH INVASIVE LOCATION;  Service: Cardiovascular;  Laterality: N/A;   • CARDIAC CATHETERIZATION N/A 3/17/2022    Procedure: Left heart cath NO LV GRAM;  Surgeon: Shakila Lam MD;  [facility];  Service: Cardiovascular;  Laterality: N/A;   • CARDIAC CATHETERIZATION N/A 3/17/2022    Procedure: Right heart cath;  Surgeon: Shakila Lam MD;  Location: [facility] CATH INVASIVE LOCATION;   Service: Cardiovascular;  Laterality: N/A;   • COLONOSCOPY     • ENDOSCOPY     • ENDOSCOPY N/A 2019    Procedure: ESOPHAGOGASTRODUODENOSCOPY WITH BIOPSY;  Surgeon: Wilbert Szymanski Jr., MD;  Location: [facility] ENDOSCOPY;  Service: General   • FINGER SURGERY Right     [facility] [place]   • FOOT SURGERY Right    • GASTRIC BANDING REMOVAL N/A 2020    Procedure: GASTRIC BANDING REMOVAL LAPAROSCOPIC, PORT REMOVAL, AND LYSIS OF ADHESIONS;  Surgeon: Wilbert Szymanski Jr., MD;  Location:  [facility];  Service: General;  Laterality: N/A;   • KNEE ARTHROPLASTY Right    • LAPAROSCOPIC CHOLECYSTECTOMY     • LAPAROSCOPIC GASTRIC BANDING     • TONSILLECTOMY         Social History     Socioeconomic History   • Marital status:    • Number of children: 1   Tobacco Use   • Smoking status: Former Smoker     Quit date: 1996     Years since quittin.3   • Smokeless tobacco: Never Used   Vaping Use   • Vaping Use: Never used   Substance and Sexual Activity   • Alcohol use: Not Currently     Comment: Caffeine use: 1 cup daily    • Drug use: Never   • Sexual activity: Defer       Family History   Problem Relation Age of Onset   • Coronary artery disease Father         CABG twice, diagnosed early 60s   • Heart disease Brother         had a PPM implanted, symptoms started mid 60s   • Malig Hyperthermia Neg Hx        The following portion of the patient's history were reviewed and updated as appropriate: past medical history, past surgical history, past social history, past family history, allergies, current medications, and problem list.    Review of Systems   Constitutional: Positive for weight gain.   Cardiovascular: Positive for dyspnea on exertion and leg swelling.   Respiratory: Positive for shortness of breath.    Musculoskeletal: Positive for joint pain.   Psychiatric/Behavioral: Positive for depression.       Allergies   Allergen Reactions   •  Penicillins Anaphylaxis   • Adhesive Tape Rash         Current Outpatient Medications:   •  albuterol sulfate  (90 Base) MCG/ACT inhaler, Inhale 2 puffs Every 4 (Four) Hours As Needed., Disp: , Rfl:   •  amLODIPine (NORVASC) 5 MG tablet, Take 0.5 tablets by mouth Daily., Disp: 30 tablet, Rfl: 0  •  aspirin 81 MG chewable tablet, Chew 81 mg Daily. HOLD PRIOR TO SURGERY 02-, Disp: , Rfl:   •  budesonide-formoterol (SYMBICORT) 80-4.5 MCG/ACT inhaler, Inhale 2 puffs 2 (Two) Times a Day., Disp: , Rfl:   •  buPROPion XL (WELLBUTRIN XL) 150 MG 24 hr tablet, Take 150 mg by mouth Daily., Disp: , Rfl:   •  chlorthalidone (HYGROTON) 25 MG tablet, Take 1 tablet by mouth Daily., Disp: 90 tablet, Rfl: 0  •  Cholecalciferol 25 MCG (1000 UT) capsule, Take 2,000 Units by mouth Daily. HOLD PRIOR TO SURGERY 02-, Disp: , Rfl:   •  cromolyn (OPTICROM) 4 % ophthalmic solution, Administer 1 drop to both eyes As Needed., Disp: , Rfl:   •  Cyanocobalamin (VITAMIN B 12 PO), Take  by mouth Daily., Disp: , Rfl:   •  famotidine (PEPCID) 20 MG tablet, Take 20 mg by mouth Every Night., Disp: , Rfl:   •  folic acid (FOLVITE) 1 MG tablet, Take 1 mg by mouth Daily., Disp: , Rfl:   •  levocetirizine (XYZAL) 5 MG tablet, Take 5 mg by mouth Daily., Disp: , Rfl:   •  losartan (COZAAR) 100 MG tablet, Take 100 mg by mouth Daily., Disp: , Rfl:   •  meloxicam (MOBIC) 15 MG tablet, Daily., Disp: , Rfl:   •  mupirocin (BACTROBAN) 2 % ointment, As Needed., Disp: , Rfl:   •  Olopatadine HCl 0.7 % solution, As Needed., Disp: , Rfl:   •  pravastatin (PRAVACHOL) 20 MG tablet, Take 20 mg by mouth Every Night., Disp: , Rfl:   •  sertraline (ZOLOFT) 100 MG tablet, Take 150 mg by mouth Daily., Disp: , Rfl:   •  Unable to find, Apply 1-2 grams to the affected area 3-4 times daily ., Disp: , Rfl:         Objective:     Vitals:    05/20/22 0904 05/20/22 0913   BP: 128/80 122/82   BP Location: Left arm Right arm   Pulse: 54    Weight: (!) 143 kg (315  "lb)    Height: 177.8 cm (70\")      Body mass index is 45.2 kg/m².    PHYSICAL EXAM:    Vitals Reviewed.   General Appearance: No acute distress, well developed and well nourished. Obese.   Eyes: Conjunctiva and lids: No erythema, swelling, or discharge. Sclera non-icteric. Glasses.   HENT: Atraumatic, normocephalic. External eyes, ears, and nose normal. No hearing loss noted. Mucous membranes normal. Lips not cyanotic. Neck supple with no tenderness. Wearing mask.   Respiratory: No signs of respiratory distress. Respiration rhythm and depth normal.   Clear to auscultation. No rales, crackles, rhonchi, or wheezing auscultated.   Cardiovascular:  Jugular Venous Pressure: Normal  Heart Rate and Rhythm: Bradycardic.  Heart Sounds: Normal S1 and S2. No S3 or S4 noted.  Murmurs: No murmurs noted. No rubs, thrills, or gallops.   Lower Extremities: Bilateral trace lower extremity edema noted.  Gastrointestinal:  Abdomen soft, non-distended, non-tender.    Musculoskeletal: Normal movement of extremities.  Skin and Nails: General appearance normal. No pallor, cyanosis, diaphoresis. Skin temperature normal. No clubbing of fingernails.   Psychiatric: Patient alert and oriented to person, place, and time. Speech and behavior appropriate. Normal mood and affect.       ECG 12 Lead    Date/Time: 5/20/2022 9:10 AM  Performed by: Jacklyn Ely APRN  Authorized by: Jacklyn Ely APRN   Comparison: compared with previous ECG from 4/18/2022  Similar to previous ECG  Rhythm: sinus bradycardia  Rate: bradycardic  BPM: 54  Conduction: conduction normal  ST Segments: ST segments normal  T Waves: T waves normal  QRS axis: normal    Clinical impression: normal ECG              Assessment:       Diagnosis Plan   1. Lower extremity edema     2. Primary hypertension     3. Shortness of breath     4. Nonobstructive atherosclerosis of coronary artery     5. Cardiac microvascular disease     6. Ectopic beats     7. Obstructive sleep apnea   "          Plan:       1.  Lower Extremity Edema: Mild edema noted today and this has improved off the furosemide.  She is taking chlorthalidone 50 mg in the morning.  She was hopeful that the swelling would have subsided completely.  I think this is most likely multifactorial from her calcium channel blocker, obesity, possible sodium in her diet, and dependent edema.  I think her swelling looks better off the furosemide now that she is on the chlorthalidone.  I have recommended stopping the amlodipine, continuing chlorthalidone, elevation of legs, compression stockings, and low-sodium diet.  I have also recommend that she keep her self well-hydrated throughout the day.  When she goes to bed at nighttime she has swelling and when she wakes up in the morning it has improved.    2.  Hypertension: Blood pressure excellent today.    3.  Shortness of Breath: I do not feel that this is from a cardiac etiology.  She will be seeing her PCP and pulmonologist soon.    4/5.  Nonobstructive atherosclerosis and cardiac microvascular disease: Noted on recent catheterization.    6.  Ectopic beats: Denies palpitations.    7.  Obstructive Sleep Apnea: Compliant with CPAP machine.    8.  She will send me a [employer] message in a few weeks with an update on her lower extremity edema, average blood pressure, and urination schedule.  If her edema does not improve off the amlodipine, then it is not related to the calcium channel blocker.    As always, it has been a pleasure to participate in your patient's care. Thank you.       Sincerely,         WILMER Carpio  [facility]      · Dictated utilizing Dragon Dictation  · COVID-19 Precautions - Patient was compliant in wearing a mask. When I saw the patient, I used appropriate personal protective equipment (PPE) including mask and eye shield (standard procedure).  Additionally, I used gown and gloves if indicated.  Hand hygiene was completed before and  after seeing the patient.  · I spent 30 minutes reviewing her medical records/testing/previous office notes/labs, face-to-face interaction with patient, physical examination, formulating the plan of care, and discussion of plan of care with patient.

## 2022-07-01 ENCOUNTER — TELEPHONE (OUTPATIENT)
Dept: CARDIOLOGY | Facility: CLINIC | Age: 67
End: 2022-07-01

## 2022-07-01 NOTE — TELEPHONE ENCOUNTER
Spoke to the patient and on average her BP has been running 100-120s/40s-70s. Urination has been fine, but still have to wake up in the night to go. Swelling is a lot better too. She said she notices it more in the evenings. 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 I told her she could try compression stockings to help with that or try to move around a little more if she can at work. I also scheduled her a 6 month follow-up with Dr. Mckay. I told her I would call her back with any other further recommendations.     Sarah Romero RN  Triage MG

## 2022-07-01 NOTE — TELEPHONE ENCOUNTER
In May, I saw patient in the office. I stopped her amlodipine and she was to continue with chlorthalidone 50 mg daily.    She was supposed to send me a [employer] message in a few weeks with an update on her lower extremity edema, average blood pressure, and urination schedule.  If her edema does not improve off the amlodipine, then it is not related to the calcium channel blocker.    I have not heard back from her.  Please call to reassess her average blood pressures, how her lower extremity edema is doing, and urination schedule.    If her blood pressures (130s/80s or less) and swelling are stable, continue current medication regimen.  She would need a 6-month follow-up visit with Dr. Johnathan Mckay.  Thank you.

## 2022-07-05 RX ORDER — AMLODIPINE BESYLATE 5 MG/1
TABLET ORAL
Qty: 30 TABLET | Refills: 0 | OUTPATIENT
Start: 2022-07-05

## 2022-11-14 RX ORDER — CHLORTHALIDONE 50 MG/1
TABLET ORAL
Qty: 90 TABLET | Refills: 1 | Status: SHIPPED | OUTPATIENT
Start: 2022-11-14 | End: 2022-11-30 | Stop reason: SDUPTHER

## 2022-11-30 RX ORDER — CHLORTHALIDONE 50 MG/1
50 TABLET ORAL DAILY
Qty: 90 TABLET | Refills: 1 | Status: SHIPPED | OUTPATIENT
Start: 2022-11-30

## 2022-12-07 NOTE — PROGRESS NOTES
RM:________     PCP: Roscoe Gonzalez MD    : 1955  AGE: 67 y.o.  EST PATIENT   REASON FOR VISIT/  CC:    BP Readings from Last 3 Encounters:   22 122/82   22 122/78   22 156/87        WT: ____________ BP: __________L __________R HR______    CHEST PAIN: _____________    SOA: _____________PALPS: _______________     LIGHTHEADED: ___________FATIGUE: ________________ EDEMA __________    ALLERGIES:Penicillins and Adhesive tape SMOKING HISTORY:  Social History     Tobacco Use   • Smoking status: Former     Types: Cigarettes     Quit date: 1996     Years since quittin.9   • Smokeless tobacco: Never   Vaping Use   • Vaping Use: Never used   Substance Use Topics   • Alcohol use: Not Currently     Comment: Caffeine use: 1 cup daily    • Drug use: Never     CAFFEINE USE_________________  ALCOHOL ______________________    Below is the patient's most recent value for Albumin, ALT, AST, BUN, Calcium, Chloride, Cholesterol, CO2, Creatinine, GFR, Glucose, HDL, Hematocrit, Hemoglobin, Hemoglobin A1C, LDL, Magnesium, Phosphorus, Platelets, Potassium, PSA, Sodium, Triglycerides, TSH and WBC.   Lab Results   Component Value Date    ALBUMIN 4.4 2022    ALT 14 2022    AST 20 2022    BUN 19 2022    CALCIUM 9.5 2022    CL 99 2022    CO2 28.0 2022    CREATININE 1.06 (H) 2022    GLU 87 2022    HDL 98 2020    HCT 38.6 2022    HGB 12.7 2022    HGBA1C 5.0 2021    LDL 63 2020    MG 2.2 2021     2022    K 3.8 2022     2022    TRIG 107 2020    TSH 3.410 2021    WBC 4.91 2022          NEW DIAGNOSIS/ SURGERY/ HOSP OR ED VISITS: ______________________    __________________________________________________________________      RECENT LABS OR DIAGNOSTIC TESTING:  _____________________________    __________________________________________________________________      ASSESSMENT/ PLAN:  _______________________________________________    __________________________________________________________________

## 2023-01-04 ENCOUNTER — OFFICE VISIT (OUTPATIENT)
Dept: CARDIOLOGY | Facility: CLINIC | Age: 68
End: 2023-01-04
Payer: COMMERCIAL

## 2023-01-04 VITALS
HEIGHT: 70 IN | HEART RATE: 70 BPM | WEIGHT: 293 LBS | DIASTOLIC BLOOD PRESSURE: 80 MMHG | BODY MASS INDEX: 41.95 KG/M2 | SYSTOLIC BLOOD PRESSURE: 128 MMHG

## 2023-01-04 DIAGNOSIS — E78.2 MIXED HYPERLIPIDEMIA: ICD-10-CM

## 2023-01-04 DIAGNOSIS — I25.89 CARDIAC MICROVASCULAR DISEASE: ICD-10-CM

## 2023-01-04 DIAGNOSIS — N18.31 STAGE 3A CHRONIC KIDNEY DISEASE: ICD-10-CM

## 2023-01-04 DIAGNOSIS — I49.49 ECTOPIC BEATS: ICD-10-CM

## 2023-01-04 DIAGNOSIS — I25.10 NONOBSTRUCTIVE ATHEROSCLEROSIS OF CORONARY ARTERY: Primary | ICD-10-CM

## 2023-01-04 DIAGNOSIS — I10 PRIMARY HYPERTENSION: ICD-10-CM

## 2023-01-04 PROBLEM — R06.02 SHORTNESS OF BREATH: Status: RESOLVED | Noted: 2022-03-14 | Resolved: 2023-01-04

## 2023-01-04 PROCEDURE — 99214 OFFICE O/P EST MOD 30 MIN: CPT | Performed by: INTERNAL MEDICINE

## 2023-01-04 PROCEDURE — 93000 ELECTROCARDIOGRAM COMPLETE: CPT | Performed by: INTERNAL MEDICINE

## 2023-01-04 RX ORDER — FLUTICASONE FUROATE, UMECLIDINIUM BROMIDE AND VILANTEROL TRIFENATATE 100; 62.5; 25 UG/1; UG/1; UG/1
POWDER RESPIRATORY (INHALATION) DAILY
COMMUNITY
Start: 2022-11-30

## 2023-01-04 NOTE — PROGRESS NOTES
Date of Office Visit: 2023  Encounter Provider: Johnathan Mckay MD  Place of Service: [facility]IOLOG[facility]  Patient Name: Rosanne Adair  :1955    Chief Complaint   Patient presents with   • Shortness of Breath   :   HPI:     Ms. Adair is 67 y.o. and presents today to follow up. I have reviewed prior notes and there are no changes except for any new updates described below. I have also reviewed any information entered into the medical record by the patient or by ancillary staff.     She has a history of DVT and PE in the past when she was on estrogen therapy.  She has not required long term anticoagulation.      She had a coronary artery calcium score in 2018; her score was 43 Agatston units (all in the RCA).    In late , she reported episodes of lightheadedness that would occur when she would change positions.  Her furosemide dose was cut in half, but this didn't help. She was referred to cardiology.  She had a PET stress which was normal, and an echo which was unremarkable.  She wore a 48 hour Holter; it showed a 2.5% burden of premature ectopics.  The lowest heart rate was 39 and occurred during sleep.  There wer rare bursts of PACs up to 6 beats, but no SVT.  She then wore a 30 day event monitor.  It also showed a 3% burden of benign ectopics.  No sustained arrhythmia was seen.  Some of the submissions for lightheadedness corresponded to a heart rate in the high 40s and 50s, but several correspond to normal heart rates.  She saw an electrophysiologist who recommended a pacemaker, but she sought a second opinion as she wondered if this was the first right step.  I felt that her positional lightheadedness was actually due to venous pooling and mild orthostasis.  I had a high suspicion that she had venous insufficiency given her weight and previous DVT.  I recommended a lower dose of diuretics, compression stockings, and leg pumps and leg cycling while seated at a  desk for long periods of time.  I did not feel that her symptoms sounded like they were consistent with conduction system disease and I did not feel that she needed a pacemaker.  We did note that she had benign-appearing ectopics, and would continue to have intermittent symptoms, but that we knew they were not going to cause her any harm, and very low burden.    She presented to [place] in September 2021 with chest pain. She woke up with lower sternal chest discomfort that lasted all day. Nothing made it better or worse. She went to [place] and ruled out for ACS. A perfusion stress test was performed and was normal, LVEF >70%. When I saw her in follow up, she was doing well.    In March 2022, she reported ongoing dyspnea and swelling. Her proBNP was normal. Increasing her furosemide did not help. We sent her for L+R cath; she had minimal CAD (30%) and normal LVEDP and right heart pressures. She was then evaluated by pulmonology and she has completed pulmonary rehab. She still has exertional dyspnea but it's improved. She has no chest pain, syncope, edema, or orthopnea. Her palpitations are intermittent and unchanged from baseline.       Past Medical History:   Diagnosis Date   • Asthma    • Cardiac microvascular disease    • DDD (degenerative disc disease), lumbar    • Depression    • Ectopic beats     PACs and PVCs   • History of DVT (deep vein thrombosis)    • History of methicillin resistant staphylococcus aureus (MRSA) 2008    FINGER WOUND-TREATED [facility]   • Hyperlipidemia    • Hypertension    • Morbid obesity (HCC)    • Nonobstructive atherosclerosis of coronary artery    • Pulmonary emboli (HCC)    • Sleep apnea     CPAP   • Stage 3a chronic kidney disease (HCC)    • Vomiting     RELATED TO LAP BAND       Past Surgical History:   Procedure Laterality Date   • CARDIAC CATHETERIZATION N/A 3/17/2022    Procedure: Coronary angiography NO LV GRAM;  Surgeon: Shakila Lam MD;   Location: [facility] CATH INVASIVE LOCATION;  Service: Cardiovascular;  Laterality: N/A;   • CARDIAC CATHETERIZATION N/A 3/17/2022    Procedure: Left heart cath NO LV GRAM;  Surgeon: Shakila Lam MD;  Location: [facility] CATH INVASIVE LOCATION;  Service: Cardiovascular;  Laterality: N/A;   • CARDIAC CATHETERIZATION N/A 3/17/2022    Procedure: Right heart cath;  Surgeon: Shakila Lam MD;  Location: [facility] CATH INVASIVE LOCATION;  Service: Cardiovascular;  Laterality: N/A;   • COLONOSCOPY     • ENDOSCOPY     • ENDOSCOPY N/A 2019    Procedure: ESOPHAGOGASTRODUODENOSCOPY WITH BIOPSY;  Surgeon: Wilbert Szymanski Jr., MD;  Location: [facility] ENDOSCOPY;  Service: General   • FINGER SURGERY Right     INDEX FINGER-MRSA WOUND TREATED [place], [place]   • FOOT SURGERY Right    • GASTRIC BANDING REMOVAL N/A 2020    Procedure: GASTRIC BANDING REMOVAL LAPAROSCOPIC, PORT REMOVAL, AND LYSIS OF ADHESIONS;  Surgeon: Wilbert Szymanski Jr., MD;  [place] [facility] [place] [facility]  Service: General;  Laterality: N/A;   • KNEE ARTHROPLASTY Right    • LAPAROSCOPIC CHOLECYSTECTOMY     • LAPAROSCOPIC GASTRIC BANDING     • TONSILLECTOMY         Social History     Socioeconomic History   • Marital status:    • Number of children: 1   Tobacco Use   • Smoking status: Former     Types: Cigarettes     Quit date: 1996     Years since quittin.0   • Smokeless tobacco: Never   Vaping Use   • Vaping Use: Never used   Substance and Sexual Activity   • Alcohol use: Not Currently     Comment: Caffeine use: 1 cup daily    • Drug use: Never   • Sexual activity: Defer       Family History   Problem Relation Age of Onset   • Coronary artery disease Father         CABG twice, diagnosed early 60s   • Heart disease Brother         had a PPM implanted, symptoms started mid 60s   • Malig Hyperthermia Neg Hx      Review of Systems   Cardiovascular: Positive for dyspnea on exertion and palpitations.   Neurological:  Positive for light-headedness.   All other systems reviewed and are negative.    Allergies   Allergen Reactions   • Penicillins Anaphylaxis   • Adhesive Tape Rash       Current Outpatient Medications:   •  albuterol sulfate  (90 Base) MCG/ACT inhaler, Inhale 2 puffs Every 4 (Four) Hours As Needed., Disp: , Rfl:   •  aspirin 81 MG chewable tablet, Chew 81 mg Daily. HOLD PRIOR TO SURGERY 02-, Disp: , Rfl:   •  buPROPion XL (WELLBUTRIN XL) 150 MG 24 hr tablet, Take 150 mg by mouth Daily., Disp: , Rfl:   •  chlorthalidone (HYGROTEN) 50 MG tablet, Take 1 tablet by mouth Daily., Disp: 90 tablet, Rfl: 1  •  Cholecalciferol 25 MCG (1000 UT) capsule, Take 2,000 Units by mouth Daily. HOLD PRIOR TO SURGERY 02-, Disp: , Rfl:   •  cromolyn (OPTICROM) 4 % ophthalmic solution, Administer 1 drop to both eyes As Needed., Disp: , Rfl:   •  Cyanocobalamin (VITAMIN B 12 PO), Take  by mouth Daily., Disp: , Rfl:   •  famotidine (PEPCID) 20 MG tablet, Take 20 mg by mouth Every Night., Disp: , Rfl:   •  folic acid (FOLVITE) 1 MG tablet, Take 1 mg by mouth Daily., Disp: , Rfl:   •  levocetirizine (XYZAL) 5 MG tablet, Take 5 mg by mouth Daily., Disp: , Rfl:   •  losartan (COZAAR) 100 MG tablet, Take 100 mg by mouth Daily., Disp: , Rfl:   •  meloxicam (MOBIC) 15 MG tablet, 7.5 mg Daily., Disp: , Rfl:   •  mupirocin (BACTROBAN) 2 % ointment, As Needed., Disp: , Rfl:   •  Olopatadine HCl 0.7 % solution, As Needed., Disp: , Rfl:   •  pravastatin (PRAVACHOL) 20 MG tablet, Take 20 mg by mouth Every Night., Disp: , Rfl:   •  sertraline (ZOLOFT) 100 MG tablet, Take 150 mg by mouth Daily., Disp: , Rfl:   •  Trelegy Ellipta 100-62.5-25 MCG/ACT inhaler, Daily., Disp: , Rfl:   •  Unable to find, Apply 1-2 grams to the affected area 3-4 times daily ., Disp: , Rfl:       Objective:     Vitals:    01/04/23 1005   BP: 128/80   BP Location: Left arm   Pulse: 70   Weight: (!) 140 kg (309 lb 6.4 oz)   Height: 177.8 cm (70\")     There were  no vitals filed for this visit.  Body mass index is 44.39 kg/m².    Vitals reviewed.   Constitutional:       Appearance: Overweight and not in distress.   Eyes:      Conjunctiva/sclera: Conjunctivae normal.   HENT:      Head: Normocephalic.      Nose: Nose normal.         Comments: Masked  Neck:      Vascular: No JVD. JVD normal.      Lymphadenopathy: No cervical adenopathy.   Pulmonary:      Effort: Pulmonary effort is normal.      Breath sounds: Normal breath sounds.   Cardiovascular:      Normal rate. Regular rhythm.      Murmurs: There is no murmur.   Pulses:     Intact distal pulses.   Edema:     Peripheral edema absent.   Abdominal:      Palpations: Abdomen is soft.      Tenderness: There is no abdominal tenderness.   Musculoskeletal: Normal range of motion.      Cervical back: Normal range of motion. Skin:     General: Skin is warm and dry.      Findings: No rash.   Neurological:      General: No focal deficit present.      Mental Status: Alert, oriented to person, place, and time and oriented to person, place and time.      Cranial Nerves: No cranial nerve deficit.   Psychiatric:         Behavior: Behavior normal.         Thought Content: Thought content normal.         Judgment: Judgment normal.           ECG 12 Lead    Date/Time: 1/4/2023 10:21 AM  Performed by: Johnathan Mckay MD  Authorized by: Johnathan Mckay MD   Comparison: compared with previous ECG   Similar to previous ECG  Rhythm: sinus rhythm  Conduction: conduction normal  T Waves: T waves normal  QRS axis: normal  Other findings: non-specific ST-T wave changes    Clinical impression: non-specific ECG            Assessment:       Diagnosis Plan   1. Nonobstructive atherosclerosis of coronary artery        2. Cardiac microvascular disease        3. Mixed hyperlipidemia        4. Primary hypertension        5. Stage 3a chronic kidney disease (HCC)        6. Ectopic beats           Plan:     I have a low suspicion that her dyspnea is due to a cardiac  cause. She has no significant CAD and normal EF and filling pressures. We can't exclude microvascular disease, but typically I see that causing chest pain more than dyspnea. She is on aspirin and pravastatin.     Her BP is well controlled.  Her palpitations are due to benign ectopics (<3% burden); she is reassured that they would not cause her any harm.  I do not feel that she needs an AV laisha blocker.    Sincerely,       Johnathan Mckay MD

## 2023-08-28 RX ORDER — CHLORTHALIDONE 50 MG/1
TABLET ORAL
Qty: 90 TABLET | Refills: 1 | Status: SHIPPED | OUTPATIENT
Start: 2023-08-28

## 2024-01-09 ENCOUNTER — OFFICE VISIT (OUTPATIENT)
Dept: CARDIOLOGY | Facility: CLINIC | Age: 69
End: 2024-01-09
Payer: COMMERCIAL

## 2024-01-09 VITALS
WEIGHT: 293 LBS | BODY MASS INDEX: 41.95 KG/M2 | HEIGHT: 70 IN | SYSTOLIC BLOOD PRESSURE: 122 MMHG | HEART RATE: 67 BPM | OXYGEN SATURATION: 98 % | DIASTOLIC BLOOD PRESSURE: 80 MMHG

## 2024-01-09 DIAGNOSIS — R06.09 DYSPNEA ON EXERTION: ICD-10-CM

## 2024-01-09 DIAGNOSIS — R42 LIGHTHEADEDNESS: ICD-10-CM

## 2024-01-09 DIAGNOSIS — R60.0 LOWER EXTREMITY EDEMA: ICD-10-CM

## 2024-01-09 DIAGNOSIS — I49.49 ECTOPIC BEATS: ICD-10-CM

## 2024-01-09 DIAGNOSIS — E66.01 MORBID OBESITY: ICD-10-CM

## 2024-01-09 DIAGNOSIS — I25.10 NONOBSTRUCTIVE ATHEROSCLEROSIS OF CORONARY ARTERY: ICD-10-CM

## 2024-01-09 DIAGNOSIS — I25.85 CARDIAC MICROVASCULAR DISEASE: Primary | ICD-10-CM

## 2024-01-09 DIAGNOSIS — I10 PRIMARY HYPERTENSION: ICD-10-CM

## 2024-01-09 DIAGNOSIS — G47.33 OBSTRUCTIVE SLEEP APNEA: ICD-10-CM

## 2024-01-09 DIAGNOSIS — R00.2 PALPITATIONS: ICD-10-CM

## 2024-01-09 PROBLEM — Z98.84 HISTORY OF REMOVAL OF LAPAROSCOPIC GASTRIC BANDING DEVICE: Status: RESOLVED | Noted: 2020-02-25 | Resolved: 2024-01-09

## 2024-01-09 PROBLEM — Z98.84 HISTORY OF LAPAROSCOPIC ADJUSTABLE GASTRIC BANDING: Status: RESOLVED | Noted: 2019-11-22 | Resolved: 2024-01-09

## 2024-01-09 RX ORDER — IPRATROPIUM BROMIDE AND ALBUTEROL SULFATE 2.5; .5 MG/3ML; MG/3ML
3 SOLUTION RESPIRATORY (INHALATION)
COMMUNITY
Start: 2023-07-24

## 2024-01-09 RX ORDER — POTASSIUM CHLORIDE 750 MG/1
20 TABLET, FILM COATED, EXTENDED RELEASE ORAL
COMMUNITY
Start: 2023-12-20 | End: 2024-12-19

## 2024-01-09 NOTE — PROGRESS NOTES
Date of Office Visit: 2024  Encounter Provider: WILMER Ochoa  Place of Service: [facility] CARDIOLOGY  Patient Name: Rosanne Adair  :1955  Primary Cardiologist: Dr. Johnathan Mckay    Chief Complaint   Patient presents with    Coronary Artery Disease    Annual Exam   :     HPI: Rosanne Adair is a 68 y.o. female who presents today for annual cardiac follow-up visit.  I reviewed her medical records.    She has been diagnosed with hypertension, obesity, ectopic beats, chronic kidney disease, and obstructive sleep apnea (compliant with CPAP).  She has had a previous DVT and PE when she was on estrogen therapy.     In , coronary artery calcium score was 43 Agatston units in the RCA.  In , cardiac PET scan was normal and echocardiogram unremarkable. 48-hour Holter monitor showed a 2.5% burden of premature ectopics and rare bursts of PACs up to 6 beats in duration. 30-day event monitor showed a 3% burden of benign ectopics.     In 2021, she had nocturnal chest pain and stress test was normal at [facility].      In 2022, she had a CT scan of the chest which showed mild coronary artery calcification.  She underwent cardiac catheterization which showed mild nonobstructive coronary disease of the apical LAD and KERA II flow throughout the coronary arteries suggestive of microvascular dysfunction. She was started on amlodipine which was discontinued due to lower extremity edema.    In 2023, she saw Dr. Mckay.  She was asked experiencing dyspnea with exertion and Dr. Mckay had low suspicion that it was cardiac.  She was having palpitations which were due to benign ectopics with a less than 2.5% burden noted in the past.    She presents today for follow-up visit.  She continues to experience dyspnea with exertion, occasional palpitations, trace lower extremity edema, and lightheadedness.  She denies chest pain, PND, orthopnea, syncope, or  bleeding.      Past Medical History:   Diagnosis Date    Asthma     Cardiac microvascular disease     DDD (degenerative disc disease), lumbar     Depression     Ectopic beats     PACs and PVCs    History of DVT (deep vein thrombosis)     History of laparoscopic adjustable gastric banding     History of methicillin resistant staphylococcus aureus (MRSA) 2008    FINGER WOUND-TREATED [facility]    History of removal of laparoscopic gastric banding device     Hyperlipidemia     Hypertension     Morbid obesity     Nonobstructive atherosclerosis of coronary artery     Pulmonary emboli     Sleep apnea     CPAP    Stage 3a chronic kidney disease     Vomiting     RELATED TO LAP BAND       Past Surgical History:   Procedure Laterality Date    CARDIAC CATHETERIZATION N/A 3/17/2022    Procedure: Coronary angiography NO LV GRAM;  Surgeon: Shakila Lam MD;  Location: [facility] CATH INVASIVE LOCATION;  Service: Cardiovascular;  Laterality: N/A;    CARDIAC CATHETERIZATION N/A 3/17/2022    Procedure: Left heart cath NO LV GRAM;  Surgeon: Shakila Lam MD;  Location: [facility] CATH INVASIVE LOCATION;  Service: Cardiovascular;  Laterality: N/A;    CARDIAC CATHETERIZATION N/A 3/17/2022    Procedure: Right heart cath;  Surgeon: Shakila Lam MD;  Location: [facility] CATH INVASIVE LOCATION;  Service: Cardiovascular;  Laterality: N/A;    COLONOSCOPY      ENDOSCOPY      ENDOSCOPY N/A 12/24/2019    Procedure: ESOPHAGOGASTRODUODENOSCOPY WITH BIOPSY;  Surgeon: Wilbert Szymanski Jr., MD;  Location: [facility] ENDOSCOPY;  Service: General    FINGER SURGERY Right     INDEX FINGER-MRSA WOUND TREATED [place], [place]    FOOT SURGERY Right     GASTRIC BANDING REMOVAL N/A 2/12/2020    Procedure: GASTRIC BANDING REMOVAL LAPAROSCOPIC, PORT REMOVAL, AND LYSIS OF ADHESIONS;  Surgeon: Wilbert Szymanski Jr., MD;  Location: [facility] OR [facility];  Service: General;  Laterality: N/A;    KNEE ARTHROPLASTY Right      LAPAROSCOPIC CHOLECYSTECTOMY      LAPAROSCOPIC GASTRIC BANDING      TONSILLECTOMY         Social History     Socioeconomic History    Marital status:     Number of children: 1   Tobacco Use    Smoking status: Former     Types: Cigarettes     Quit date: 1996     Years since quittin.0    Smokeless tobacco: Never   Vaping Use    Vaping Use: Never used   Substance and Sexual Activity    Alcohol use: Not Currently     Comment: Caffeine use: 1 cup daily     Drug use: Never    Sexual activity: Defer       Family History   Problem Relation Age of Onset    Coronary artery disease Father         CABG twice, diagnosed early 60s    Heart disease Brother         had a PPM implanted, symptoms started mid 60s    Malig Hyperthermia Neg Hx        The following portion of the patient's history were reviewed and updated as appropriate: past medical history, past surgical history, past social history, past family history, allergies, current medications, and problem list.    Review of Systems   Constitutional: Negative.   Cardiovascular:  Positive for dyspnea on exertion, leg swelling and palpitations.   Respiratory: Negative.     Hematologic/Lymphatic: Negative.    Musculoskeletal:  Positive for joint pain.   Neurological:  Positive for light-headedness.       Allergies   Allergen Reactions    Penicillins Anaphylaxis    Adhesive Tape Rash         Current Outpatient Medications:     albuterol sulfate  (90 Base) MCG/ACT inhaler, Inhale 2 puffs Every 4 (Four) Hours As Needed., Disp: , Rfl:     aspirin 81 MG chewable tablet, Chew 1 tablet Daily. HOLD PRIOR TO SURGERY 2020, Disp: , Rfl:     chlorthalidone (HYGROTEN) 50 MG tablet, TAKE 1 TABLET DAILY, Disp: 90 tablet, Rfl: 1    Cholecalciferol 25 MCG (1000 UT) capsule, Take 2 capsules by mouth Daily. HOLD PRIOR TO SURGERY 2020, Disp: , Rfl:     cromolyn (OPTICROM) 4 % ophthalmic solution, Administer 1 drop to both eyes As Needed., Disp: , Rfl:      "Cyanocobalamin (VITAMIN B 12 PO), Take  by mouth Daily., Disp: , Rfl:     famotidine (PEPCID) 20 MG tablet, Take 1 tablet by mouth Every Night., Disp: , Rfl:     folic acid (FOLVITE) 1 MG tablet, Take 1 tablet by mouth Daily., Disp: , Rfl:     ipratropium-albuterol (DUO-NEB) 0.5-2.5 mg/3 ml nebulizer, Inhale 3 mL., Disp: , Rfl:     levocetirizine (XYZAL) 5 MG tablet, Take 1 tablet by mouth Daily., Disp: , Rfl:     losartan (COZAAR) 100 MG tablet, Take 0.5 tablets by mouth Daily., Disp: , Rfl:     mupirocin (BACTROBAN) 2 % ointment, As Needed., Disp: , Rfl:     Olopatadine HCl 0.7 % solution, As Needed., Disp: , Rfl:     potassium chloride 10 MEQ CR tablet, Take 2 tablets by mouth., Disp: , Rfl:     pravastatin (PRAVACHOL) 20 MG tablet, Take 1 tablet by mouth Every Night., Disp: , Rfl:     sertraline (ZOLOFT) 100 MG tablet, Take 1.5 tablets by mouth Daily., Disp: , Rfl:     Trelegy Ellipta 100-62.5-25 MCG/ACT inhaler, Daily., Disp: , Rfl:     Unable to find, Apply 1-2 grams to the affected area 3-4 times daily ., Disp: , Rfl:          Objective:     Vitals:    01/09/24 1432   BP: 122/80   BP Location: Left arm   Patient Position: Sitting   Cuff Size: Adult   Pulse: 67   SpO2: 98%   Weight: (!) 138 kg (305 lb)   Height: 177.8 cm (70\")     Body mass index is 43.76 kg/m².    PHYSICAL EXAM:    Vitals Reviewed.   General Appearance: No acute distress, well developed and well nourished. Obese.   Eyes: Glasses.   HENT: No hearing loss noted.    Respiratory: No signs of respiratory distress. Respiration rhythm and depth normal.  Clear to auscultation.   Cardiovascular:  Jugular Venous Pressure: Normal  Heart Rate and Rhythm: Normal, Heart Sounds: Normal S1 and S2. No S3 or S4 noted.  Murmurs: No murmurs noted. No rubs, thrills, or gallops.   Lower Extremities: Bilateral trace lower extremity edema noted.  Musculoskeletal: Normal movement of extremities.  Skin: General appearance normal.    Psychiatric: Patient alert and " oriented to person, place, and time. Speech and behavior appropriate. Normal mood and affect.       ECG 12 Lead    Date/Time: 1/9/2024 2:33 PM  Performed by: Jacklyn Ely APRN    Authorized by: Jacklyn Ely APRN  Comparison: compared with previous ECG from 1/4/2023  Similar to previous ECG  Rhythm: sinus rhythm  Rate: normal  BPM: 67  Conduction: conduction normal  ST Segments: ST segments normal  T Waves: T waves normal  QRS axis: normal    Clinical impression: normal ECG            Assessment:       Diagnosis Plan   1. Cardiac microvascular disease        2. Nonobstructive atherosclerosis of coronary artery        3. Palpitations        4. Ectopic beats        5. Dyspnea on exertion        6. Lower extremity edema        7. Lightheadedness        8. Primary hypertension        9. Obstructive sleep apnea        10. Morbid obesity               Plan:       1/2.  Cardiac microvascular disease: Diagnosed with mild nonobstructive disease of the LAD and KERA II flow throughout the coronary arteries suggestive of microvascular dysfunction.  Risk factor modification discussed.  Continue aspirin and pravastatin.  Denies angina.    3/4.  Palpitations: Occur on an occasional basis.  She was noted to have a 2.5% PAC burden in 2018.  I offered a beta-blocker, but she has opted to try magnesium oxide 40 mg 1 to 2 tablets at nighttime.    5/6.  Intermittent dyspnea with exertion and lower extremity edema.  Stable.    7.  Lightheadedness: I recommended increasing water intake since she takes chlorthalidone.    8.  Hypertension: Blood pressure normal today.    9.  Obstructive sleep apnea: On CPAP.    10.  Obesity. Body mass index is 43.76 kg/m².     11.  I recommended a 1 year follow-up visit with Dr. Johnathan Mckay.    As always, it has been a pleasure to participate in your patient's care. Thank you.         Sincerely,         WILMER Carpio  [facility]      Dictated utilizing Dragon  Dictation

## 2024-02-15 RX ORDER — CHLORTHALIDONE 50 MG/1
TABLET ORAL
Qty: 90 TABLET | Refills: 1 | Status: SHIPPED | OUTPATIENT
Start: 2024-02-15

## 2025-01-20 ENCOUNTER — OFFICE VISIT (OUTPATIENT)
Dept: CARDIOLOGY | Facility: CLINIC | Age: 70
End: 2025-01-20
Payer: COMMERCIAL

## 2025-01-20 VITALS
HEART RATE: 76 BPM | SYSTOLIC BLOOD PRESSURE: 118 MMHG | WEIGHT: 251.2 LBS | DIASTOLIC BLOOD PRESSURE: 80 MMHG | HEIGHT: 70 IN | OXYGEN SATURATION: 96 % | BODY MASS INDEX: 35.96 KG/M2

## 2025-01-20 DIAGNOSIS — E66.01 MORBID OBESITY: ICD-10-CM

## 2025-01-20 DIAGNOSIS — I25.85 CARDIAC MICROVASCULAR DISEASE: Primary | ICD-10-CM

## 2025-01-20 DIAGNOSIS — I25.10 NONOBSTRUCTIVE ATHEROSCLEROSIS OF CORONARY ARTERY: ICD-10-CM

## 2025-01-20 DIAGNOSIS — N18.31 STAGE 3A CHRONIC KIDNEY DISEASE: ICD-10-CM

## 2025-01-20 DIAGNOSIS — I10 PRIMARY HYPERTENSION: ICD-10-CM

## 2025-01-20 DIAGNOSIS — I49.49 ECTOPIC BEATS: ICD-10-CM

## 2025-01-20 DIAGNOSIS — G47.33 OBSTRUCTIVE SLEEP APNEA: ICD-10-CM

## 2025-01-20 PROBLEM — R60.0 LOWER EXTREMITY EDEMA: Status: RESOLVED | Noted: 2022-04-18 | Resolved: 2025-01-20

## 2025-01-20 PROCEDURE — 99214 OFFICE O/P EST MOD 30 MIN: CPT | Performed by: NURSE PRACTITIONER

## 2025-01-20 PROCEDURE — 93000 ELECTROCARDIOGRAM COMPLETE: CPT | Performed by: NURSE PRACTITIONER

## 2025-01-20 RX ORDER — EPINEPHRINE 0.3 MG/.3ML
INJECTION SUBCUTANEOUS
COMMUNITY
Start: 2024-10-20

## 2025-01-20 RX ORDER — TIRZEPATIDE 5 MG/.5ML
5 INJECTION, SOLUTION SUBCUTANEOUS
COMMUNITY
Start: 2024-10-15 | End: 2025-10-15

## 2025-01-20 RX ORDER — BUPROPION HYDROCHLORIDE 150 MG/1
150 TABLET ORAL DAILY
COMMUNITY
Start: 2025-01-02

## 2025-01-20 NOTE — PROGRESS NOTES
Date of Office Visit: 2025  Encounter Provider: WILMER Ochoa  Place of Service: [facility] CARDIOLOGY  Patient Name: Rosanne Adair  :1955  Primary Cardiologist: Dr. Johnathan Mckay     Chief Complaint   Patient presents with    Annual Exam    Coronary Artery Disease   :     HPI: Rosanne Adair is a 69 y.o. female who presents today for annual cardiac follow-up visit.  I reviewed her medical records.     She has been diagnosed with hypertension, obesity, ectopic beats, chronic kidney disease, and obstructive sleep apnea (compliant with CPAP).  She has had a previous DVT and PE when she was on estrogen therapy.      She has been diagnosed with coronary artery calcification per CT scan in .  She has known PACs and PVCs per Holter monitor and 30-day event monitor in .  She did a normal stress test in .      In 2022, she had a CT scan of the chest which showed mild coronary artery calcification. She underwent cardiac catheterization which showed mild nonobstructive coronary disease of the apical LAD and KERA II flow throughout the coronary arteries suggestive of microvascular dysfunction. She was started on amlodipine, but was discontinued due to lower extremity edema.     She presents today for follow-up visit.  She has lost 55 pounds over the past year by taking Wegovy or Zepbound.  Her lightheadedness has significantly improved off chlorthalidone.  When lying in bed, she feels palpitations.  She denies chest pain, shortness of air, PND, orthopnea, edema, syncope, or bleeding.  Blood pressure and heart rate are normal today.      Past Medical History:   Diagnosis Date    Asthma     Cardiac microvascular disease     DDD (degenerative disc disease), lumbar     Depression     Ectopic beats     PACs and PVCs    History of DVT (deep vein thrombosis)     History of laparoscopic adjustable gastric banding     History of methicillin resistant  staphylococcus aureus (MRSA) 2008    FINGER WOUND-TREATED [facility]    History of removal of laparoscopic gastric banding device     Hyperlipidemia     Hypertension     Morbid obesity     Nonobstructive atherosclerosis of coronary artery     Pulmonary emboli     Sleep apnea     CPAP    Stage 3a chronic kidney disease     Vomiting     RELATED TO LAP BAND       Past Surgical History:   Procedure Laterality Date    CARDIAC CATHETERIZATION N/A 3/17/2022    Procedure: Coronary angiography NO LV GRAM;  Surgeon: Shakila Lam MD;  [facility];  Service: Cardiovascular;  Laterality: N/A;    CARDIAC CATHETERIZATION N/A 3/17/2022    Procedure: Left heart cath NO LV GRAM;  Surgeon: Shakila Lam MD;  Location:  [facility] CATH INVASIVE LOCATION;  Service: Cardiovascular;  Laterality: N/A;    CARDIAC CATHETERIZATION N/A 3/17/2022    Procedure: Right heart cath;  Surgeon: Shakila Lam MD;  Location:  [facility] CATH INVASIVE LOCATION;  Service: Cardiovascular;  Laterality: N/A;    COLONOSCOPY      ENDOSCOPY      ENDOSCOPY N/A 12/24/2019    Procedure: ESOPHAGOGASTRODUODENOSCOPY WITH BIOPSY;  Surgeon: Wilbert Szymanski Jr., MD;  Location: [facility] ENDOSCOPY;  Service: General    FINGER SURGERY Right     INDEX FINGER-MRSA WOUND TREATED [facility], [place]    FOOT SURGERY Right     GASTRIC BANDING REMOVAL N/A 2/12/2020    Procedure: GASTRIC BANDING REMOVAL LAPAROSCOPIC, PORT REMOVAL, AND LYSIS OF ADHESIONS;  Surgeon: Wilbert Szymanski Jr., MD;  [place] [facility] [place] [facility] [place]  Service: General;  Laterality: N/A;    KNEE ARTHROPLASTY Right     LAPAROSCOPIC CHOLECYSTECTOMY      LAPAROSCOPIC GASTRIC BANDING      TONSILLECTOMY         Social History     Socioeconomic History    Marital status:     Number of children: 1   Tobacco Use    Smoking status: Former     Current packs/day: 0.00     Types: Cigarettes     Quit date: 1/8/1996     Years since quitting:  29.0    Smokeless tobacco: Never   Vaping Use    Vaping status: Never Used   Substance and Sexual Activity    Alcohol use: Not Currently     Comment: Caffeine use: 1 cup daily     Drug use: Never    Sexual activity: Defer       Family History   Problem Relation Age of Onset    Coronary artery disease Father         CABG twice, diagnosed early 60s    Heart disease Brother         had a PPM implanted, symptoms started mid 60s    Malig Hyperthermia Neg Hx        The following portion of the patient's history were reviewed and updated as appropriate: past medical history, past surgical history, past social history, past family history, allergies, current medications, and problem list.    Review of Systems   Constitutional: Negative.   Cardiovascular:  Positive for palpitations.   Respiratory: Negative.     Hematologic/Lymphatic: Negative.    Neurological:  Positive for light-headedness.       Allergies   Allergen Reactions    Penicillins Anaphylaxis    Adhesive Tape Rash         Current Outpatient Medications:     albuterol sulfate  (90 Base) MCG/ACT inhaler, Inhale 2 puffs Every 4 (Four) Hours As Needed., Disp: , Rfl:     aspirin 81 MG chewable tablet, Chew 1 tablet Daily. Takes 1 tablet 4 times a week, Disp: , Rfl:     buPROPion XL (WELLBUTRIN XL) 150 MG 24 hr tablet, Take 1 tablet by mouth Daily., Disp: , Rfl:     Cholecalciferol 25 MCG (1000 UT) capsule, Take 2 capsules by mouth Daily. HOLD PRIOR TO SURGERY 02-, Disp: , Rfl:     cromolyn (OPTICROM) 4 % ophthalmic solution, Administer 1 drop to both eyes As Needed., Disp: , Rfl:     Cyanocobalamin (VITAMIN B 12 PO), Take  by mouth Daily., Disp: , Rfl:     EPINEPHrine (EPIPEN) 0.3 MG/0.3ML solution auto-injector injection, , Disp: , Rfl:     famotidine (PEPCID) 20 MG tablet, Take 1 tablet by mouth Every Night., Disp: , Rfl:     folic acid (FOLVITE) 1 MG tablet, Take 1 tablet by mouth Daily., Disp: , Rfl:     ipratropium-albuterol (DUO-NEB) 0.5-2.5 mg/3  "ml nebulizer, Inhale 3 mL., Disp: , Rfl:     levocetirizine (XYZAL) 5 MG tablet, Take 1 tablet by mouth Daily., Disp: , Rfl:     losartan (COZAAR) 100 MG tablet, Take 0.5 tablets by mouth Daily., Disp: , Rfl:     Olopatadine HCl 0.7 % solution, As Needed., Disp: , Rfl:     Potassium (POTASSIMIN PO), Take 20 mEq by mouth. TAKES 2 PILLS EVERY OTHER DAY, Disp: , Rfl:     pravastatin (PRAVACHOL) 20 MG tablet, Take 1 tablet by mouth Every Night., Disp: , Rfl:     sertraline (ZOLOFT) 100 MG tablet, Take 1.5 tablets by mouth Daily., Disp: , Rfl:     Trelegy Ellipta 100-62.5-25 MCG/ACT inhaler, Daily., Disp: , Rfl:     Zepbound 5 MG/0.5ML solution auto-injector, Inject 0.5 mL under the skin into the appropriate area as directed., Disp: , Rfl:          Objective:     Vitals:    01/20/25 0922   BP: 118/80   BP Location: Left arm   Patient Position: Sitting   Cuff Size: Adult   Pulse: 76   SpO2: 96%   Weight: 114 kg (251 lb 3.2 oz)   Height: 177.8 cm (70\")     Body mass index is 36.04 kg/m².    PHYSICAL EXAM:    Vitals Reviewed.   General Appearance: No acute distress, well developed and well nourished. Obese.   Eyes: Glasses.   HENT: No hearing loss noted.    Respiratory: No signs of respiratory distress. Respiration rhythm and depth normal.  Clear to auscultation.   Cardiovascular:  Jugular Venous Pressure: Normal  Heart Rate and Rhythm: Normal, Heart Sounds: Normal S1 and S2. No S3 or S4 noted.  Murmurs: No murmurs noted. No rubs, thrills, or gallops.   Lower Extremities: No edema noted.  Musculoskeletal: Normal movement of extremities.  Skin: General appearance normal.    Psychiatric: Patient alert and oriented to person, place, and time. Speech and behavior appropriate. Normal mood and affect.       ECG 12 Lead    Date/Time: 1/20/2025 9:21 AM  Performed by: Jacklyn Ely APRN    Authorized by: Jacklyn Ely APRN  Comparison: compared with previous ECG from 1/9/2024  Similar to previous ECG  Rhythm: sinus " rhythm  Rate: normal  BPM: 76  Conduction: conduction normal  QRS axis: normal  Other findings: non-specific ST-T wave changes    Clinical impression: non-specific ECG            Assessment:       Diagnosis Plan   1. Cardiac microvascular disease        2. Nonobstructive atherosclerosis of coronary artery        3. Ectopic beats        4. Primary hypertension        5. Stage 3a chronic kidney disease        6. Obstructive sleep apnea        7. Morbid obesity               Plan:       1/2.  Coronary microvascular disease. Diagnosed with mild nonobstructive disease of the LAD and KERA II flow throughout the coronary arteries suggestive of microvascular dysfunction. Risk factor modification discussed. Continue aspirin and pravastatin. Denies angina.     3.  PACs/PVCs: Reports palpitations when she lies down at nighttime.  I recommended taking magnesium oxide 4 and her milligrams 1 tablet an hour before bed.  I told her we also could try her on a beta-blocker if she wished.    4.  Hypertension: Blood pressure normal today.    5.  Chronic kidney disease stage III.  Followed by her PCP.    6.  Obstructive sleep apnea: Compliant with CPAP.    7.  Obesity: Body mass index is 36.04 kg/m².  She has lost 55 pounds over the past year.    8.  She will call with any cardiac concerns.  Follow-up with Dr. Mckay in 1 year.    As always, it has been a pleasure to participate in your patient's care. Thank you.         Sincerely,         WILMER Carpio  [facility] Cardiology      Dictated utilizing Dragon Dictation

## 2025-02-20 NOTE — TELEPHONE ENCOUNTER
"Called Rosanne Adair to follow-up.    Patient stated she had a PFT at [facility].  She stated she would try and send the result to [facility] for you to review.    Patient stated she still has SOA with exertion but that she it is not any worse and she is \"used to it\".      Patient is still waiting for CT chest to be completed and is unsure when it will be scheduled. Patient stated she thinks she can wait until 3/14 to be seen but also wanted to let us know that she will not be seen by Pulmonary until 4/1 (earliest appointment that was available).     Please let me know how you would like to proceed.    Thank you,  Patricia Garza RN  Triage Nurse [facility]    " verbal cues/1 person assist

## (undated) DEVICE — FRCP BX RADJAW4 NDL 2.8 240CM LG OG BX40

## (undated) DEVICE — APPL CHLORAPREP W/TINT 26ML ORNG

## (undated) DEVICE — PK CATH CARD 40

## (undated) DEVICE — KT VLV BIOGUARD SXN BIOP AIR/H20 CONN 4PC DISP

## (undated) DEVICE — 2, DISPOSABLE SUCTION/IRRIGATOR WITH DISPOSABLE TIP: Brand: STRYKEFLOW

## (undated) DEVICE — ENDOPATH XCEL WITH OPTIVIEW TECHNOLOGY BLADELESS TROCARS WITH STABILITY SLEEVES: Brand: ENDOPATH XCEL OPTIVIEW

## (undated) DEVICE — ADHS SKIN DERMABOND TOP ADVANCED

## (undated) DEVICE — DEV COND GAS LAP INSUFLOW W/LUER CONN

## (undated) DEVICE — RADIFOCUS OPTITORQUE ANGIOGRAPHIC CATHETER: Brand: OPTITORQUE

## (undated) DEVICE — PK BARIATRIC 10 40 70

## (undated) DEVICE — MINI ENDOCUT SCISSOR TIP, DISPOSABLE: Brand: RENEW

## (undated) DEVICE — TR BAND RADIAL ARTERY COMPRESSION DEVICE: Brand: TR BAND

## (undated) DEVICE — GLIDESHEATH SLENDER STAINLESS STEEL KIT: Brand: GLIDESHEATH SLENDER

## (undated) DEVICE — PENCL E/S HNDSWCH ROCKR CB

## (undated) DEVICE — ENDOPATH XCEL BLADELESS TROCARS WITH STABILITY SLEEVES: Brand: ENDOPATH XCEL

## (undated) DEVICE — GLV SURG SENSICARE PI PF LF 8.5 GRN STRL

## (undated) DEVICE — KT MANIFLD CARDIAC

## (undated) DEVICE — ENDOPATH XCEL UNIVERSAL TROCAR STABLILITY SLEEVES: Brand: ENDOPATH XCEL

## (undated) DEVICE — BALN PRESS WEDGE 5F 110CM

## (undated) DEVICE — SENSR O2 OXIMAX FNGR A/ 18IN NONSTR

## (undated) DEVICE — DISPOSABLE MONOPOLAR ENDOSCOPIC CORD 10 FT. (3M): Brand: KIRWAN

## (undated) DEVICE — SUT MNCRYL 4/0 PS2 18 IN

## (undated) DEVICE — GW EMR FIX EXCHG J STD .035 3MM 260CM

## (undated) DEVICE — PAD GRND REM POLYHESIVE A/ DISP

## (undated) DEVICE — TROCAR: Brand: KII OPTICAL ACCESS SYSTEM

## (undated) DEVICE — SUT VIC 2/0 SH 27IN

## (undated) DEVICE — BITEBLOCK OMNI BLOC

## (undated) DEVICE — LN SMPL CO2 SHTRM SD STREAM W/M LUER

## (undated) DEVICE — MSK PROC CURAPLEX O2 2/ADAPT 7FT

## (undated) DEVICE — TUBING, SUCTION, 1/4" X 10', STRAIGHT: Brand: MEDLINE

## (undated) DEVICE — GLIDESHEATH BASIC HYDROPHILIC COATED INTRODUCER SHEATH: Brand: GLIDESHEATH

## (undated) DEVICE — GLV SURG SENSICARE MICRO PF LF 8.5 STRL